# Patient Record
Sex: FEMALE | Race: WHITE | NOT HISPANIC OR LATINO | Employment: UNEMPLOYED | URBAN - NONMETROPOLITAN AREA
[De-identification: names, ages, dates, MRNs, and addresses within clinical notes are randomized per-mention and may not be internally consistent; named-entity substitution may affect disease eponyms.]

---

## 2023-02-24 LAB
ALANINE AMINOTRANSFERASE (SGPT) (U/L) IN SER/PLAS: 16 U/L (ref 7–45)
ALBUMIN (G/DL) IN SER/PLAS: 4 G/DL (ref 3.4–5)
ALKALINE PHOSPHATASE (U/L) IN SER/PLAS: 80 U/L (ref 33–136)
ANION GAP IN SER/PLAS: 11 MMOL/L (ref 10–20)
ASPARTATE AMINOTRANSFERASE (SGOT) (U/L) IN SER/PLAS: 21 U/L (ref 9–39)
BILIRUBIN TOTAL (MG/DL) IN SER/PLAS: 0.9 MG/DL (ref 0–1.2)
CALCIUM (MG/DL) IN SER/PLAS: 9.3 MG/DL (ref 8.6–10.3)
CARBON DIOXIDE, TOTAL (MMOL/L) IN SER/PLAS: 29 MMOL/L (ref 21–32)
CHLORIDE (MMOL/L) IN SER/PLAS: 101 MMOL/L (ref 98–107)
CHOLESTEROL (MG/DL) IN SER/PLAS: 236 MG/DL (ref 0–199)
CHOLESTEROL (MG/DL) IN SER/PLAS: 236 MG/DL (ref 0–199)
CHOLESTEROL IN HDL (MG/DL) IN SER/PLAS: 95 MG/DL
CHOLESTEROL IN HDL (MG/DL) IN SER/PLAS: 95 MG/DL
CHOLESTEROL/HDL RATIO: 2.5
CHOLESTEROL/HDL RATIO: 2.5
COBALAMIN (VITAMIN B12) (PG/ML) IN SER/PLAS: 362 PG/ML (ref 211–911)
CREATININE (MG/DL) IN SER/PLAS: 0.76 MG/DL (ref 0.5–1.05)
ERYTHROCYTE DISTRIBUTION WIDTH (RATIO) BY AUTOMATED COUNT: 13.6 % (ref 11.5–14.5)
ERYTHROCYTE MEAN CORPUSCULAR HEMOGLOBIN CONCENTRATION (G/DL) BY AUTOMATED: 31.9 G/DL (ref 32–36)
ERYTHROCYTE MEAN CORPUSCULAR VOLUME (FL) BY AUTOMATED COUNT: 97 FL (ref 80–100)
ERYTHROCYTES (10*6/UL) IN BLOOD BY AUTOMATED COUNT: 4.33 X10E12/L (ref 4–5.2)
GFR FEMALE: 82 ML/MIN/1.73M2
GLUCOSE (MG/DL) IN SER/PLAS: 100 MG/DL (ref 74–99)
HEMATOCRIT (%) IN BLOOD BY AUTOMATED COUNT: 42 % (ref 36–46)
HEMOGLOBIN (G/DL) IN BLOOD: 13.4 G/DL (ref 12–16)
LDL: 125 MG/DL (ref 0–99)
LEUKOCYTES (10*3/UL) IN BLOOD BY AUTOMATED COUNT: 5.9 X10E9/L (ref 4.4–11.3)
NON-HDL CHOLESTEROL: 141 MG/DL
PLATELETS (10*3/UL) IN BLOOD AUTOMATED COUNT: 278 X10E9/L (ref 150–450)
POTASSIUM (MMOL/L) IN SER/PLAS: 3.9 MMOL/L (ref 3.5–5.3)
PROTEIN TOTAL: 7 G/DL (ref 6.4–8.2)
SODIUM (MMOL/L) IN SER/PLAS: 137 MMOL/L (ref 136–145)
THYROTROPIN (MIU/L) IN SER/PLAS BY DETECTION LIMIT <= 0.05 MIU/L: 3.26 MIU/L (ref 0.44–3.98)
TRIGLYCERIDE (MG/DL) IN SER/PLAS: 79 MG/DL (ref 0–149)
UREA NITROGEN (MG/DL) IN SER/PLAS: 13 MG/DL (ref 6–23)
VLDL: 16 MG/DL (ref 0–40)

## 2023-05-26 ENCOUNTER — APPOINTMENT (OUTPATIENT)
Dept: PRIMARY CARE | Facility: CLINIC | Age: 73
End: 2023-05-26
Payer: MEDICARE

## 2023-06-16 ENCOUNTER — APPOINTMENT (OUTPATIENT)
Dept: PRIMARY CARE | Facility: CLINIC | Age: 73
End: 2023-06-16
Payer: MEDICARE

## 2023-08-31 ENCOUNTER — TELEPHONE (OUTPATIENT)
Dept: PRIMARY CARE | Facility: CLINIC | Age: 73
End: 2023-08-31
Payer: MEDICARE

## 2023-09-29 ENCOUNTER — OFFICE VISIT (OUTPATIENT)
Dept: PRIMARY CARE | Facility: CLINIC | Age: 73
End: 2023-09-29
Payer: MEDICARE

## 2023-09-29 VITALS
DIASTOLIC BLOOD PRESSURE: 84 MMHG | WEIGHT: 128.4 LBS | HEIGHT: 60 IN | SYSTOLIC BLOOD PRESSURE: 148 MMHG | HEART RATE: 66 BPM | TEMPERATURE: 97 F | BODY MASS INDEX: 25.21 KG/M2 | OXYGEN SATURATION: 96 %

## 2023-09-29 DIAGNOSIS — I10 BENIGN ESSENTIAL HTN: ICD-10-CM

## 2023-09-29 DIAGNOSIS — J20.9 ACUTE BRONCHITIS, UNSPECIFIED ORGANISM: ICD-10-CM

## 2023-09-29 DIAGNOSIS — R05.1 ACUTE COUGH: ICD-10-CM

## 2023-09-29 DIAGNOSIS — J02.9 SORE THROAT: ICD-10-CM

## 2023-09-29 DIAGNOSIS — F03.90 DEMENTIA WITHOUT BEHAVIORAL DISTURBANCE, PSYCHOTIC DISTURBANCE, MOOD DISTURBANCE, OR ANXIETY, UNSPECIFIED DEMENTIA SEVERITY, UNSPECIFIED DEMENTIA TYPE (MULTI): Primary | ICD-10-CM

## 2023-09-29 DIAGNOSIS — F32.A ANXIETY AND DEPRESSION: ICD-10-CM

## 2023-09-29 DIAGNOSIS — F41.9 ANXIETY AND DEPRESSION: ICD-10-CM

## 2023-09-29 DIAGNOSIS — E78.5 HYPERLIPIDEMIA, UNSPECIFIED HYPERLIPIDEMIA TYPE: ICD-10-CM

## 2023-09-29 DIAGNOSIS — E03.9 HYPOTHYROIDISM, UNSPECIFIED TYPE: ICD-10-CM

## 2023-09-29 DIAGNOSIS — E55.9 VITAMIN D DEFICIENCY: ICD-10-CM

## 2023-09-29 PROBLEM — N85.02 ATYPICAL ENDOMETRIAL HYPERPLASIA: Status: RESOLVED | Noted: 2023-09-29 | Resolved: 2023-09-29

## 2023-09-29 PROBLEM — J30.9 ALLERGIC RHINITIS: Status: ACTIVE | Noted: 2023-09-29

## 2023-09-29 PROBLEM — K57.90 DIVERTICULOSIS: Status: RESOLVED | Noted: 2023-09-29 | Resolved: 2023-09-29

## 2023-09-29 PROBLEM — D22.5 MELANOCYTIC NEVI OF TRUNK: Status: RESOLVED | Noted: 2019-11-22 | Resolved: 2023-09-29

## 2023-09-29 PROBLEM — S62.101A AVULSION FRACTURE OF RIGHT WRIST: Status: RESOLVED | Noted: 2023-09-29 | Resolved: 2023-09-29

## 2023-09-29 LAB — POC RAPID STREP: NEGATIVE

## 2023-09-29 PROCEDURE — 1036F TOBACCO NON-USER: CPT | Performed by: FAMILY MEDICINE

## 2023-09-29 PROCEDURE — 3077F SYST BP >= 140 MM HG: CPT | Performed by: FAMILY MEDICINE

## 2023-09-29 PROCEDURE — 3079F DIAST BP 80-89 MM HG: CPT | Performed by: FAMILY MEDICINE

## 2023-09-29 PROCEDURE — 1159F MED LIST DOCD IN RCRD: CPT | Performed by: FAMILY MEDICINE

## 2023-09-29 PROCEDURE — 87880 STREP A ASSAY W/OPTIC: CPT | Performed by: FAMILY MEDICINE

## 2023-09-29 PROCEDURE — 1160F RVW MEDS BY RX/DR IN RCRD: CPT | Performed by: FAMILY MEDICINE

## 2023-09-29 PROCEDURE — 99214 OFFICE O/P EST MOD 30 MIN: CPT | Performed by: FAMILY MEDICINE

## 2023-09-29 PROCEDURE — 87635 SARS-COV-2 COVID-19 AMP PRB: CPT

## 2023-09-29 PROCEDURE — 1126F AMNT PAIN NOTED NONE PRSNT: CPT | Performed by: FAMILY MEDICINE

## 2023-09-29 RX ORDER — PRAVASTATIN SODIUM 20 MG/1
20 TABLET ORAL NIGHTLY
Qty: 90 TABLET | Refills: 1 | Status: SHIPPED | OUTPATIENT
Start: 2023-09-29 | End: 2024-02-15 | Stop reason: SDUPTHER

## 2023-09-29 RX ORDER — AZITHROMYCIN 250 MG/1
TABLET, FILM COATED ORAL
Qty: 6 TABLET | Refills: 0 | Status: SHIPPED | OUTPATIENT
Start: 2023-09-29 | End: 2023-10-12 | Stop reason: ALTCHOICE

## 2023-09-29 RX ORDER — PRAVASTATIN SODIUM 20 MG/1
1 TABLET ORAL NIGHTLY
COMMUNITY
Start: 2017-11-20 | End: 2023-09-29 | Stop reason: SDUPTHER

## 2023-09-29 RX ORDER — DONEPEZIL HYDROCHLORIDE 10 MG/1
1 TABLET, FILM COATED ORAL NIGHTLY
COMMUNITY
Start: 2022-05-12 | End: 2023-09-29 | Stop reason: ALTCHOICE

## 2023-09-29 RX ORDER — FLUTICASONE PROPIONATE 50 MCG
SPRAY, SUSPENSION (ML) NASAL
COMMUNITY
Start: 2015-10-14

## 2023-09-29 RX ORDER — LEVOTHYROXINE SODIUM 50 UG/1
TABLET ORAL
COMMUNITY
Start: 2015-12-10 | End: 2023-10-03 | Stop reason: ALTCHOICE

## 2023-09-29 RX ORDER — ESCITALOPRAM OXALATE 5 MG/1
5 TABLET ORAL DAILY
Qty: 30 TABLET | Refills: 2 | Status: SHIPPED | OUTPATIENT
Start: 2023-09-29 | End: 2024-02-15 | Stop reason: SDUPTHER

## 2023-09-29 RX ORDER — METOPROLOL SUCCINATE 50 MG/1
1 TABLET, EXTENDED RELEASE ORAL DAILY
COMMUNITY
Start: 2017-11-10 | End: 2023-09-29 | Stop reason: SDUPTHER

## 2023-09-29 RX ORDER — METOPROLOL SUCCINATE 50 MG/1
50 TABLET, EXTENDED RELEASE ORAL DAILY
Qty: 90 TABLET | Refills: 1 | Status: SHIPPED | OUTPATIENT
Start: 2023-09-29 | End: 2024-02-15 | Stop reason: SDUPTHER

## 2023-09-29 RX ORDER — ASPIRIN 81 MG/1
1 TABLET ORAL DAILY
COMMUNITY
Start: 2017-01-16 | End: 2023-10-12 | Stop reason: ALTCHOICE

## 2023-09-29 ASSESSMENT — PATIENT HEALTH QUESTIONNAIRE - PHQ9
SUM OF ALL RESPONSES TO PHQ9 QUESTIONS 1 AND 2: 0
1. LITTLE INTEREST OR PLEASURE IN DOING THINGS: NOT AT ALL
2. FEELING DOWN, DEPRESSED OR HOPELESS: NOT AT ALL

## 2023-09-29 ASSESSMENT — ENCOUNTER SYMPTOMS
OCCASIONAL FEELINGS OF UNSTEADINESS: 0
LOSS OF SENSATION IN FEET: 0
DEPRESSION: 0

## 2023-09-29 NOTE — PROGRESS NOTES
Subjective   Patient ID: Meryl Leonard is a 73 y.o. female who presents for Medication Problem (Is not taking any medication/Hot flashes all the time).  HPI  Here for follow up . Stopped all her medications including her thyroid and blood pressure medication. Family and patient does not want to restart her thyroid medication till get blood work checked, since even when she was taking it, she was having hot flashes and weight gain.   Family concerned because patient has been drinking heavily was in the ED recently after suffering a fall intoxicated, had bruised her ribs. Has dementia, no longer taking donepezil. Also has been depressed and anxious.   Has been having cough and sore throat x 2 weeks, feels fatigued. No fever. No nausea/vomiting/diarrhea.     Review of Systems  General: no fever  Eyes: no blurry vision  ENT: see HPI  Resp: see HPI  Cardio: no chest pain, no palpitations  Abd: no nausea/vomiting  : no dysuria, no increased urinary frequency      /84 Comment: manual  Pulse 66   Temp 36.1 °C (97 °F)   Ht 1.524 m (5')   Wt 58.2 kg (128 lb 6.4 oz)   SpO2 96%   BMI 25.08 kg/m²       Objective   Physical Exam  Gen: NAD, alert  Head: normocephalic/atraumatic  Eyes: conjunctivae normal  Ears: canals clear bilaterally, TM normal   Nose: external nose normal   Oropharynx: clear   Resp: Clear to auscultation  CVS: Regular rate and rhythm  Abdomen: soft, NT, ND  Ext: no edema, NT of lower extremities  Neuro: gait normal     Assessment/Plan   Problem List Items Addressed This Visit       Benign essential HTN    Relevant Medications    metoprolol succinate XL (Toprol-XL) 50 mg 24 hr tablet    Dementia (CMS/HCC) - Primary    Hypothyroid    Relevant Orders    TSH with reflex to Free T4 if abnormal    Hyperlipidemia    Relevant Medications    pravastatin (Pravachol) 20 mg tablet     Other Visit Diagnoses       Vitamin D deficiency        Relevant Orders    Vitamin B12    Vitamin D 25-Hydroxy,Total (for  eval of Vitamin D levels)    Acute cough        Relevant Orders    Sars-CoV-2 PCR, Symptomatic    Sore throat        Relevant Orders    POCT rapid strep A manually resulted (Completed)    Acute bronchitis, unspecified organism        Relevant Medications    azithromycin (Zithromax) 250 mg tablet    Anxiety and depression        Relevant Medications    escitalopram (Lexapro) 5 mg tablet

## 2023-09-30 LAB — SARS-COV-2 RESULT: NOT DETECTED

## 2023-10-02 ENCOUNTER — LAB (OUTPATIENT)
Dept: LAB | Facility: LAB | Age: 73
End: 2023-10-02
Payer: MEDICARE

## 2023-10-02 DIAGNOSIS — E55.9 VITAMIN D DEFICIENCY: ICD-10-CM

## 2023-10-02 DIAGNOSIS — E03.9 HYPOTHYROIDISM, UNSPECIFIED TYPE: ICD-10-CM

## 2023-10-02 LAB
T4 FREE SERPL-MCNC: 0.63 NG/DL (ref 0.61–1.12)
TSH SERPL-ACNC: 9.48 MIU/L (ref 0.44–3.98)

## 2023-10-02 PROCEDURE — 36415 COLL VENOUS BLD VENIPUNCTURE: CPT

## 2023-10-03 DIAGNOSIS — E03.9 HYPOTHYROIDISM, UNSPECIFIED TYPE: Primary | ICD-10-CM

## 2023-10-03 LAB
25(OH)D3 SERPL-MCNC: 32 NG/ML (ref 30–100)
VIT B12 SERPL-MCNC: 411 PG/ML (ref 211–911)

## 2023-10-03 RX ORDER — LEVOTHYROXINE SODIUM 75 UG/1
75 TABLET ORAL DAILY
Qty: 90 TABLET | Refills: 1 | Status: SHIPPED | OUTPATIENT
Start: 2023-10-03 | End: 2023-10-12 | Stop reason: SDUPTHER

## 2023-10-12 ENCOUNTER — OFFICE VISIT (OUTPATIENT)
Dept: PRIMARY CARE | Facility: CLINIC | Age: 73
End: 2023-10-12
Payer: MEDICARE

## 2023-10-12 VITALS
HEIGHT: 61 IN | HEART RATE: 72 BPM | SYSTOLIC BLOOD PRESSURE: 132 MMHG | TEMPERATURE: 98.9 F | DIASTOLIC BLOOD PRESSURE: 70 MMHG | RESPIRATION RATE: 19 BRPM | OXYGEN SATURATION: 96 % | BODY MASS INDEX: 24.26 KG/M2

## 2023-10-12 DIAGNOSIS — E03.9 HYPOTHYROIDISM, UNSPECIFIED TYPE: ICD-10-CM

## 2023-10-12 DIAGNOSIS — I10 BENIGN ESSENTIAL HTN: Primary | ICD-10-CM

## 2023-10-12 DIAGNOSIS — S23.41XA SPRAIN OF COSTAL CARTILAGE, INITIAL ENCOUNTER: ICD-10-CM

## 2023-10-12 DIAGNOSIS — E78.2 MIXED HYPERLIPIDEMIA: ICD-10-CM

## 2023-10-12 DIAGNOSIS — E03.8 OTHER SPECIFIED HYPOTHYROIDISM: ICD-10-CM

## 2023-10-12 DIAGNOSIS — R41.3 MEMORY LOSS: ICD-10-CM

## 2023-10-12 PROCEDURE — 1036F TOBACCO NON-USER: CPT | Performed by: FAMILY MEDICINE

## 2023-10-12 PROCEDURE — 1159F MED LIST DOCD IN RCRD: CPT | Performed by: FAMILY MEDICINE

## 2023-10-12 PROCEDURE — 3078F DIAST BP <80 MM HG: CPT | Performed by: FAMILY MEDICINE

## 2023-10-12 PROCEDURE — 99214 OFFICE O/P EST MOD 30 MIN: CPT | Performed by: FAMILY MEDICINE

## 2023-10-12 PROCEDURE — 3075F SYST BP GE 130 - 139MM HG: CPT | Performed by: FAMILY MEDICINE

## 2023-10-12 PROCEDURE — 99204 OFFICE O/P NEW MOD 45 MIN: CPT | Performed by: FAMILY MEDICINE

## 2023-10-12 PROCEDURE — 1160F RVW MEDS BY RX/DR IN RCRD: CPT | Performed by: FAMILY MEDICINE

## 2023-10-12 PROCEDURE — 1126F AMNT PAIN NOTED NONE PRSNT: CPT | Performed by: FAMILY MEDICINE

## 2023-10-12 RX ORDER — TRAMADOL HYDROCHLORIDE 50 MG/1
50 TABLET ORAL EVERY 8 HOURS PRN
Qty: 15 TABLET | Refills: 0 | Status: SHIPPED | OUTPATIENT
Start: 2023-10-12 | End: 2023-10-17

## 2023-10-12 RX ORDER — LEVOTHYROXINE SODIUM 75 UG/1
75 TABLET ORAL DAILY
Qty: 90 TABLET | Refills: 3 | Status: SHIPPED | OUTPATIENT
Start: 2023-10-12 | End: 2024-02-15 | Stop reason: SDUPTHER

## 2023-10-12 ASSESSMENT — PAIN SCALES - GENERAL: PAINLEVEL: 0-NO PAIN

## 2023-10-12 NOTE — PROGRESS NOTES
"Subjective   Patient ID: Meryl Leonard is a 73 y.o. female who presents for New Patient Visit (NEW PATIENT HERE TO DISCUSS BLOOD WORK FROM PREVIUS DOCTOR. ALSO WOULD LIKE  HORMONE LEVELS CHECKED ).    HPI She fell recently and went to er dx with bruised ribs.   Pt present with two family members today who give most of history.  She has had some ongoing memory loss.      Review of Systems   Constitutional: Negative.    HENT: Negative.     Respiratory: Negative.     Cardiovascular: Negative.    Gastrointestinal: Negative.    Genitourinary: Negative.    Musculoskeletal:         See HPI   Neurological:  Negative for dizziness, seizures and syncope.       Objective   /70 (BP Location: Right arm, Patient Position: Sitting, BP Cuff Size: Adult)   Pulse 72   Temp 37.2 °C (98.9 °F) (Oral)   Resp 19   Ht 1.549 m (5' 1\")   SpO2 96%   BMI 24.26 kg/m²     Physical Exam  Vitals and nursing note reviewed.   Constitutional:       General: She is not in acute distress.  HENT:      Right Ear: Tympanic membrane and ear canal normal.      Left Ear: Tympanic membrane and ear canal normal.      Nose: Nose normal. No rhinorrhea.      Mouth/Throat:      Pharynx: Oropharynx is clear. No oropharyngeal exudate or posterior oropharyngeal erythema.      Comments: Dentition wnl  Eyes:      Extraocular Movements: Extraocular movements intact.      Conjunctiva/sclera: Conjunctivae normal.      Pupils: Pupils are equal, round, and reactive to light.   Neck:      Vascular: No carotid bruit.   Cardiovascular:      Rate and Rhythm: Normal rate and regular rhythm.      Heart sounds: Normal heart sounds. No murmur heard.  Pulmonary:      Breath sounds: Normal breath sounds. No wheezing or rhonchi.   Abdominal:      General: Bowel sounds are normal. There is no distension.      Palpations: Abdomen is soft. There is no mass.      Tenderness: There is no abdominal tenderness. There is no guarding or rebound.      Hernia: No hernia is present. "   Musculoskeletal:         General: No swelling or tenderness. Normal range of motion.      Cervical back: Normal range of motion and neck supple.      Comments: Some tenderness right ribs   Lymphadenopathy:      Cervical: No cervical adenopathy.   Skin:     General: Skin is warm.      Findings: No rash.   Neurological:      General: No focal deficit present.      Mental Status: She is alert.         Assessment/Plan   Problem List Items Addressed This Visit             ICD-10-CM    Benign essential HTN - Primary I10    Hypothyroid E03.9    Relevant Medications    levothyroxine (Synthroid, Levoxyl) 75 mcg tablet    Hyperlipidemia E78.5     Other Visit Diagnoses         Codes    Memory loss     R41.3    Relevant Orders    Referral to Neurology    Sprain of costal cartilage, initial encounter     S23.41XA    Relevant Medications    traMADol (Ultram) 50 mg tablet          Will restart and continue meds as she has only been back on them for a short time.  Recheck for fasting labs in 2 monhts and can determine if any dosing adjustment needed at that time

## 2023-10-15 ASSESSMENT — ENCOUNTER SYMPTOMS
SEIZURES: 0
DIZZINESS: 0
CARDIOVASCULAR NEGATIVE: 1
RESPIRATORY NEGATIVE: 1
GASTROINTESTINAL NEGATIVE: 1
CONSTITUTIONAL NEGATIVE: 1

## 2023-10-17 ENCOUNTER — APPOINTMENT (OUTPATIENT)
Dept: PRIMARY CARE | Facility: CLINIC | Age: 73
End: 2023-10-17
Payer: MEDICARE

## 2023-11-17 ENCOUNTER — APPOINTMENT (OUTPATIENT)
Dept: PRIMARY CARE | Facility: CLINIC | Age: 73
End: 2023-11-17
Payer: MEDICARE

## 2023-11-30 ENCOUNTER — APPOINTMENT (OUTPATIENT)
Dept: RADIOLOGY | Facility: HOSPITAL | Age: 73
End: 2023-11-30
Payer: MEDICARE

## 2023-11-30 ENCOUNTER — HOSPITAL ENCOUNTER (EMERGENCY)
Facility: HOSPITAL | Age: 73
Discharge: HOME | End: 2023-11-30
Attending: STUDENT IN AN ORGANIZED HEALTH CARE EDUCATION/TRAINING PROGRAM
Payer: MEDICARE

## 2023-11-30 VITALS
OXYGEN SATURATION: 96 % | HEART RATE: 83 BPM | HEIGHT: 61 IN | RESPIRATION RATE: 16 BRPM | BODY MASS INDEX: 25.49 KG/M2 | SYSTOLIC BLOOD PRESSURE: 176 MMHG | TEMPERATURE: 97.5 F | WEIGHT: 135 LBS | DIASTOLIC BLOOD PRESSURE: 78 MMHG

## 2023-11-30 DIAGNOSIS — S92.415A CLOSED NONDISPLACED FRACTURE OF PROXIMAL PHALANX OF LEFT GREAT TOE, INITIAL ENCOUNTER: Primary | ICD-10-CM

## 2023-11-30 PROCEDURE — 73630 X-RAY EXAM OF FOOT: CPT | Mod: LT,FY

## 2023-11-30 PROCEDURE — 73630 X-RAY EXAM OF FOOT: CPT | Mod: LEFT SIDE | Performed by: RADIOLOGY

## 2023-11-30 PROCEDURE — 99283 EMERGENCY DEPT VISIT LOW MDM: CPT | Performed by: STUDENT IN AN ORGANIZED HEALTH CARE EDUCATION/TRAINING PROGRAM

## 2023-11-30 RX ORDER — IBUPROFEN 400 MG/1
400 TABLET ORAL ONCE
Status: DISCONTINUED | OUTPATIENT
Start: 2023-11-30 | End: 2023-11-30 | Stop reason: HOSPADM

## 2023-11-30 RX ORDER — IBUPROFEN 200 MG
200 TABLET ORAL EVERY 8 HOURS PRN
Qty: 20 TABLET | Refills: 0 | Status: SHIPPED | OUTPATIENT
Start: 2023-11-30 | End: 2023-12-07

## 2023-11-30 ASSESSMENT — PAIN DESCRIPTION - ORIENTATION: ORIENTATION: LEFT

## 2023-11-30 ASSESSMENT — PAIN - FUNCTIONAL ASSESSMENT
PAIN_FUNCTIONAL_ASSESSMENT: 0-10
PAIN_FUNCTIONAL_ASSESSMENT: 0-10

## 2023-11-30 ASSESSMENT — PAIN DESCRIPTION - LOCATION: LOCATION: FOOT

## 2023-11-30 ASSESSMENT — PAIN DESCRIPTION - FREQUENCY: FREQUENCY: CONSTANT/CONTINUOUS

## 2023-11-30 ASSESSMENT — PAIN SCALES - GENERAL
PAINLEVEL_OUTOF10: 5 - MODERATE PAIN
PAINLEVEL_OUTOF10: 5 - MODERATE PAIN

## 2023-11-30 ASSESSMENT — PAIN DESCRIPTION - PAIN TYPE: TYPE: ACUTE PAIN

## 2023-12-01 NOTE — ED PROVIDER NOTES
HPI   Chief Complaint   Patient presents with    Foot Injury     Fell today injuring left foot       73-year-old female was outside walking earlier today when she slipped on her driveway coming down on her left foot has pain and swelling  To the proximal first phalanx                          No data recorded                Patient History   Past Medical History:   Diagnosis Date    Atypical endometrial hyperplasia 2023    Cyst of Bartholin's gland     Bartholin cyst    Mixed incontinence 2017    Urinary incontinence, mixed    Overactive bladder     OAB (overactive bladder)    Personal history of malignant neoplasm of other parts of uterus 11/10/2017    History of malignant neoplasm of endometrium    Personal history of other diseases of the female genital tract 2016    History of postmenopausal bleeding    Personal history of other diseases of urinary system     History of prolapse of bladder    Personal history of transient ischemic attack (TIA), and cerebral infarction without residual deficits     History of transient cerebral ischemia    Varicella without complication     Varicella without complication     Past Surgical History:   Procedure Laterality Date    HYSTEROSCOPY  11/10/2017    Hysteroscopy With Endometrial Ablation    OTHER SURGICAL HISTORY  2016    Dental Surgery    TOTAL ABDOMINAL HYSTERECTOMY W/ BILATERAL SALPINGOOPHORECTOMY  11/10/2017    Total Abdominal Hysterectomy With Removal Of Both Ovaries     Family History   Problem Relation Name Age of Onset    Hypertension Father      Hypertension Sister      Heart disease Sister      Heart attack Sister      Diabetes Paternal Grandfather      Other (gynecological cancer) Other aunt      Social History     Tobacco Use    Smoking status: Former     Packs/day: 1.00     Years: 10.00     Additional pack years: 0.00     Total pack years: 10.00     Types: Cigarettes     Quit date: 1995     Years since quittin.9     Passive  exposure: Past    Smokeless tobacco: Never   Vaping Use    Vaping Use: Never used   Substance Use Topics    Alcohol use: Yes     Alcohol/week: 2.0 standard drinks of alcohol     Types: 2 Glasses of wine per week    Drug use: Never       Physical Exam   ED Triage Vitals [11/30/23 1618]   Temp Heart Rate Resp BP   36.4 °C (97.5 °F) 83 16 176/78      SpO2 Temp Source Heart Rate Source Patient Position   96 % Tympanic -- Sitting      BP Location FiO2 (%)     Left arm --       Physical Exam  Vitals and nursing note reviewed.   Constitutional:       General: She is not in acute distress.     Appearance: Normal appearance. She is well-developed.   HENT:      Head: Normocephalic and atraumatic.   Eyes:      Conjunctiva/sclera: Conjunctivae normal.   Cardiovascular:      Rate and Rhythm: Normal rate and regular rhythm.      Heart sounds: No murmur heard.  Pulmonary:      Effort: Pulmonary effort is normal. No respiratory distress.      Breath sounds: Normal breath sounds.   Abdominal:      Palpations: Abdomen is soft.      Tenderness: There is no abdominal tenderness.   Musculoskeletal:         General: No swelling.      Cervical back: Neck supple.      Comments: Pain and tenderness to the first phalanx of the left foot with slight swelling   Skin:     General: Skin is warm and dry.      Capillary Refill: Capillary refill takes less than 2 seconds.   Neurological:      General: No focal deficit present.      Mental Status: She is alert.   Psychiatric:         Mood and Affect: Mood normal.         ED Course & MDM   Diagnoses as of 11/30/23 1907   Closed nondisplaced fracture of proximal phalanx of left great toe, initial encounter       Medical Decision Making  Patient with fracture to the first proximal phalanx of the left foot  Sandeep taped and postop shoe  Will refer to podiatry for further eval and treatment    XR foot left 3+ views   Final Result    DJD as described.          Acute nondisplaced  fracture involving the  proximal medial corner of    the 1st proximal phalanx.          Signed by: Bc Bliss 11/30/2023 4:53 PM    Dictation workstation:   [id number]             Procedure  Procedures     Bella Cao PA-C  11/30/23 1911

## 2023-12-07 ENCOUNTER — CLINICAL SUPPORT (OUTPATIENT)
Dept: ORTHOPEDIC SURGERY | Facility: CLINIC | Age: 73
End: 2023-12-07
Payer: MEDICARE

## 2023-12-07 ENCOUNTER — APPOINTMENT (OUTPATIENT)
Dept: NEUROLOGY | Facility: CLINIC | Age: 73
End: 2023-12-07
Payer: MEDICARE

## 2023-12-07 VITALS — WEIGHT: 125 LBS | BODY MASS INDEX: 23.6 KG/M2 | HEIGHT: 61 IN

## 2023-12-07 DIAGNOSIS — S92.415A NONDISPLACED FRACTURE OF PROXIMAL PHALANX OF LEFT GREAT TOE, INITIAL ENCOUNTER FOR CLOSED FRACTURE: Primary | ICD-10-CM

## 2023-12-07 PROCEDURE — 99203 OFFICE O/P NEW LOW 30 MIN: CPT

## 2023-12-07 ASSESSMENT — PAIN SCALES - GENERAL: PAINLEVEL_OUTOF10: 4

## 2023-12-07 ASSESSMENT — PAIN - FUNCTIONAL ASSESSMENT: PAIN_FUNCTIONAL_ASSESSMENT: 0-10

## 2023-12-07 NOTE — PROGRESS NOTES
HPI  Meryl Leonard is a 73 y.o. female, accompanied by her daughter,  in office today for   Chief Complaint   Patient presents with    Left Foot - Pain   .  she fell on the ice a week ago.  She went to the [facility] ED at the time, diagnosed with a big toe fracture, placed into post op shoe.  Patient has been wearing shoe.  She states she is not having much pain at this point and has been walking on it as tolerated.      Medication  Current Outpatient Medications on File Prior to Visit   Medication Sig Dispense Refill    escitalopram (Lexapro) 5 mg tablet Take 1 tablet (5 mg) by mouth once daily. Start after finishing antibiotics 30 tablet 2    fluticasone (Flonase) 50 mcg/actuation nasal spray Use 1 spray in each nostril two times daily for 90      ibuprofen 200 mg tablet Take 1 tablet (200 mg) by mouth every 8 hours if needed for moderate pain (4 - 6) for up to 7 days. 20 tablet 0    levothyroxine (Synthroid, Levoxyl) 75 mcg tablet Take 1 tablet (75 mcg) by mouth once daily. 90 tablet 3    metoprolol succinate XL (Toprol-XL) 50 mg 24 hr tablet Take 1 tablet (50 mg) by mouth once daily. 90 tablet 1    pravastatin (Pravachol) 20 mg tablet Take 1 tablet (20 mg) by mouth once daily at bedtime. 90 tablet 1     No current facility-administered medications on file prior to visit.       Physical Exam  Constitutional: well developed, well nourished female in no acute distress  Psychiatric: normal mood, appropriate affect  Eyes: sclera anicteric  HENT: normocephalic/atraumatic  CV: regular rate and rhythm   Respiratory: non labored breathing  Integumentary: no rash  Neurological: moves all extremities    Left Ankle Exam     Tenderness   The patient is experiencing no tenderness.   Swelling: mild (about big toe)    Range of Motion   The patient has normal left ankle ROM.     Other   Erythema: absent  Scars: absent  Sensation: normal    Comments:  Mild, clearing ecchymosis at base of big toe.              Imaging/Lab:  X-rays  were taken 11/30/23 which were reviewed by myself and read by radiology and show an acute, nondisplaced fracture involving the proximal medial corner of the 1st proximal phalanx      Assessment  Assessment: nondisplaced fracture of the proximal phalanx of the left great toe    Plan  Plan:  History, physical exam, and imaging were reviewed with patient. Discussed continuing in the post op shoe while having pain.  As the pain continues to subside and the swelling goes down, can transition to a good, supportive shoe.  Continue to be weightbearing as tolerated.  RICE and antiinflammatories as needed for pain.  Follow Up: Patient to follow up as needed if pain persists or gets worse.      All questions were answered for the patient prior to end of exam and patient addressed their understanding.    Rachael Guidry PA-C  12/07/23

## 2024-02-08 ENCOUNTER — TELEPHONE (OUTPATIENT)
Dept: PRIMARY CARE | Facility: CLINIC | Age: 74
End: 2024-02-08
Payer: MEDICARE

## 2024-02-08 NOTE — TELEPHONE ENCOUNTER
Patient daughter took her to new PCP, Marylou was not aware and Meryl did not want to change.  She does not want to go to other PCP, can she come back to this office??

## 2024-02-15 ENCOUNTER — OFFICE VISIT (OUTPATIENT)
Dept: PRIMARY CARE | Facility: CLINIC | Age: 74
End: 2024-02-15
Payer: MEDICARE

## 2024-02-15 VITALS
HEIGHT: 61 IN | WEIGHT: 132 LBS | TEMPERATURE: 98.7 F | HEART RATE: 87 BPM | SYSTOLIC BLOOD PRESSURE: 120 MMHG | DIASTOLIC BLOOD PRESSURE: 68 MMHG | RESPIRATION RATE: 20 BRPM | OXYGEN SATURATION: 98 % | BODY MASS INDEX: 24.92 KG/M2

## 2024-02-15 DIAGNOSIS — Z00.00 ROUTINE MEDICAL EXAM: ICD-10-CM

## 2024-02-15 DIAGNOSIS — Z12.31 ENCOUNTER FOR SCREENING MAMMOGRAM FOR MALIGNANT NEOPLASM OF BREAST: ICD-10-CM

## 2024-02-15 DIAGNOSIS — M81.0 OSTEOPOROSIS, UNSPECIFIED OSTEOPOROSIS TYPE, UNSPECIFIED PATHOLOGICAL FRACTURE PRESENCE: ICD-10-CM

## 2024-02-15 DIAGNOSIS — Z12.11 SCREENING FOR COLON CANCER: Primary | ICD-10-CM

## 2024-02-15 DIAGNOSIS — E78.5 HYPERLIPIDEMIA, UNSPECIFIED HYPERLIPIDEMIA TYPE: ICD-10-CM

## 2024-02-15 DIAGNOSIS — F41.9 ANXIETY AND DEPRESSION: ICD-10-CM

## 2024-02-15 DIAGNOSIS — I10 BENIGN ESSENTIAL HTN: ICD-10-CM

## 2024-02-15 DIAGNOSIS — E03.9 HYPOTHYROIDISM, UNSPECIFIED TYPE: ICD-10-CM

## 2024-02-15 DIAGNOSIS — Z13.820 SCREENING FOR OSTEOPOROSIS: ICD-10-CM

## 2024-02-15 DIAGNOSIS — F32.A ANXIETY AND DEPRESSION: ICD-10-CM

## 2024-02-15 DIAGNOSIS — F03.90 DEMENTIA WITHOUT BEHAVIORAL DISTURBANCE, PSYCHOTIC DISTURBANCE, MOOD DISTURBANCE, OR ANXIETY, UNSPECIFIED DEMENTIA SEVERITY, UNSPECIFIED DEMENTIA TYPE (MULTI): ICD-10-CM

## 2024-02-15 PROCEDURE — 99213 OFFICE O/P EST LOW 20 MIN: CPT | Performed by: FAMILY MEDICINE

## 2024-02-15 PROCEDURE — 3074F SYST BP LT 130 MM HG: CPT | Performed by: FAMILY MEDICINE

## 2024-02-15 PROCEDURE — 99397 PER PM REEVAL EST PAT 65+ YR: CPT | Performed by: FAMILY MEDICINE

## 2024-02-15 PROCEDURE — 1036F TOBACCO NON-USER: CPT | Performed by: FAMILY MEDICINE

## 2024-02-15 PROCEDURE — 1157F ADVNC CARE PLAN IN RCRD: CPT | Performed by: FAMILY MEDICINE

## 2024-02-15 PROCEDURE — 3078F DIAST BP <80 MM HG: CPT | Performed by: FAMILY MEDICINE

## 2024-02-15 PROCEDURE — 1126F AMNT PAIN NOTED NONE PRSNT: CPT | Performed by: FAMILY MEDICINE

## 2024-02-15 RX ORDER — ESCITALOPRAM OXALATE 5 MG/1
5 TABLET ORAL DAILY
Qty: 90 TABLET | Refills: 3 | Status: SHIPPED | OUTPATIENT
Start: 2024-02-15 | End: 2024-04-19 | Stop reason: WASHOUT

## 2024-02-15 RX ORDER — PRAVASTATIN SODIUM 20 MG/1
20 TABLET ORAL NIGHTLY
Qty: 90 TABLET | Refills: 1 | Status: SHIPPED | OUTPATIENT
Start: 2024-02-15

## 2024-02-15 RX ORDER — METOPROLOL SUCCINATE 50 MG/1
50 TABLET, EXTENDED RELEASE ORAL DAILY
Qty: 90 TABLET | Refills: 1 | Status: SHIPPED | OUTPATIENT
Start: 2024-02-15

## 2024-02-15 RX ORDER — LEVOTHYROXINE SODIUM 75 UG/1
75 TABLET ORAL DAILY
Qty: 90 TABLET | Refills: 3 | Status: SHIPPED | OUTPATIENT
Start: 2024-02-15

## 2024-02-15 RX ORDER — ESCITALOPRAM OXALATE 5 MG/1
5 TABLET ORAL DAILY
Qty: 30 TABLET | Refills: 2 | Status: SHIPPED | OUTPATIENT
Start: 2024-02-15 | End: 2024-02-15 | Stop reason: SDUPTHER

## 2024-02-15 ASSESSMENT — ENCOUNTER SYMPTOMS
NEUROLOGICAL NEGATIVE: 1
CONSTITUTIONAL NEGATIVE: 1
RESPIRATORY NEGATIVE: 1
MUSCULOSKELETAL NEGATIVE: 1
CARDIOVASCULAR NEGATIVE: 1
GASTROINTESTINAL NEGATIVE: 1

## 2024-02-15 ASSESSMENT — PATIENT HEALTH QUESTIONNAIRE - PHQ9
1. LITTLE INTEREST OR PLEASURE IN DOING THINGS: NOT AT ALL
2. FEELING DOWN, DEPRESSED OR HOPELESS: NOT AT ALL
SUM OF ALL RESPONSES TO PHQ9 QUESTIONS 1 AND 2: 0

## 2024-02-15 ASSESSMENT — PAIN SCALES - GENERAL: PAINLEVEL: 0-NO PAIN

## 2024-02-15 NOTE — PROGRESS NOTES
"Subjective   Patient ID: Meryl Leonard is a 73 y.o. female who presents for Follow-up (PATIENT HERE TO DISCUSS HER MEDICATIONS. SHE HAS NOT BEEN TAKING ANYTHING ON HER MEDICATION LIST AND UNSURE OF WHAT SHE SHOULD BE TAKING).    HPI she has been off all of her meds for over a month.  She presents with her daughter today who give much of history.  She has seen neuro in the past for memory loss but has not had any follow up and did not go to last referral They do notice that she has had some progression of memory loss.  She has currently moved in with her.      Review of Systems   Constitutional: Negative.    HENT: Negative.     Respiratory: Negative.     Cardiovascular: Negative.    Gastrointestinal: Negative.    Genitourinary: Negative.    Musculoskeletal: Negative.    Neurological: Negative.        Objective   /68 (BP Location: Right arm, Patient Position: Sitting, BP Cuff Size: Adult)   Pulse 87   Temp 37.1 °C (98.7 °F) (Skin)   Resp 20   Ht 1.549 m (5' 1\")   Wt 59.9 kg (132 lb)   SpO2 98%   BMI 24.94 kg/m²     Physical Exam  Vitals and nursing note reviewed.   Constitutional:       General: She is not in acute distress.  HENT:      Right Ear: Tympanic membrane and ear canal normal.      Left Ear: Tympanic membrane and ear canal normal.      Nose: Nose normal. No rhinorrhea.      Mouth/Throat:      Pharynx: Oropharynx is clear. No oropharyngeal exudate or posterior oropharyngeal erythema.      Comments: Dentition wnl  Eyes:      Extraocular Movements: Extraocular movements intact.      Conjunctiva/sclera: Conjunctivae normal.      Pupils: Pupils are equal, round, and reactive to light.   Neck:      Vascular: No carotid bruit.   Cardiovascular:      Rate and Rhythm: Normal rate and regular rhythm.      Heart sounds: Normal heart sounds. No murmur heard.  Pulmonary:      Breath sounds: Normal breath sounds. No wheezing or rhonchi.   Abdominal:      General: Bowel sounds are normal. There is no " distension.      Palpations: Abdomen is soft. There is no mass.      Tenderness: There is no abdominal tenderness. There is no guarding or rebound.      Hernia: No hernia is present.   Musculoskeletal:         General: No swelling or tenderness. Normal range of motion.      Cervical back: Normal range of motion and neck supple.   Lymphadenopathy:      Cervical: No cervical adenopathy.   Skin:     General: Skin is warm.      Findings: No rash.   Neurological:      General: No focal deficit present.      Mental Status: She is alert.         Assessment/Plan   Problem List Items Addressed This Visit             ICD-10-CM    Benign essential HTN I10    Relevant Medications    metoprolol succinate XL (Toprol-XL) 50 mg 24 hr tablet    Other Relevant Orders    CBC and Auto Differential    Dementia (CMS/HCC) F03.90    Relevant Orders    Referral to Neurology    Hypothyroid E03.9    Relevant Medications    levothyroxine (Synthroid, Levoxyl) 75 mcg tablet    Other Relevant Orders    CBC and Auto Differential    TSH with reflex to Free T4 if abnormal    Hyperlipidemia E78.5    Relevant Medications    pravastatin (Pravachol) 20 mg tablet     Other Visit Diagnoses         Codes    Screening for colon cancer    -  Primary Z12.11    Relevant Orders    Cologuard® colon cancer screening    Routine medical exam     Z00.00    Relevant Orders    CT cardiac scoring wo IV contrast    CBC and Auto Differential    Comprehensive Metabolic Panel    TSH with reflex to Free T4 if abnormal    Lipid Panel    Encounter for screening mammogram for malignant neoplasm of breast     Z12.31    Relevant Orders    BI mammo bilateral screening tomosynthesis    Screening for osteoporosis     Z13.820    Relevant Orders    XR DEXA bone density    CBC and Auto Differential    Osteoporosis, unspecified osteoporosis type, unspecified pathological fracture presence     M81.0    Relevant Orders    XR DEXA bone density    CBC and Auto Differential    Anxiety and  depression     F41.9, F32.A    Relevant Medications    escitalopram (Lexapro) 5 mg tablet    Other Relevant Orders    CBC and Auto Differential        Refill and restart meds and follow up for fasting labs in approx 8 wks will set up with neuro for eval

## 2024-02-19 ENCOUNTER — HOSPITAL ENCOUNTER (OUTPATIENT)
Dept: RADIOLOGY | Facility: HOSPITAL | Age: 74
Discharge: HOME | End: 2024-02-19
Payer: MEDICARE

## 2024-02-19 DIAGNOSIS — Z00.00 ROUTINE MEDICAL EXAM: ICD-10-CM

## 2024-02-19 DIAGNOSIS — Z12.31 ENCOUNTER FOR SCREENING MAMMOGRAM FOR MALIGNANT NEOPLASM OF BREAST: ICD-10-CM

## 2024-02-19 PROCEDURE — 77063 BREAST TOMOSYNTHESIS BI: CPT | Mod: BILATERAL PROCEDURE | Performed by: RADIOLOGY

## 2024-02-19 PROCEDURE — 77067 SCR MAMMO BI INCL CAD: CPT

## 2024-02-19 PROCEDURE — 75571 CT HRT W/O DYE W/CA TEST: CPT

## 2024-02-19 PROCEDURE — 77067 SCR MAMMO BI INCL CAD: CPT | Mod: BILATERAL PROCEDURE | Performed by: RADIOLOGY

## 2024-03-01 ENCOUNTER — LAB (OUTPATIENT)
Dept: LAB | Facility: LAB | Age: 74
End: 2024-03-01
Payer: MEDICARE

## 2024-03-01 ENCOUNTER — HOSPITAL ENCOUNTER (OUTPATIENT)
Dept: RADIOLOGY | Facility: HOSPITAL | Age: 74
Discharge: HOME | End: 2024-03-01
Payer: MEDICARE

## 2024-03-01 DIAGNOSIS — R41.3 OTHER AMNESIA: Primary | ICD-10-CM

## 2024-03-01 DIAGNOSIS — R41.3 OTHER AMNESIA: ICD-10-CM

## 2024-03-01 LAB
ALBUMIN SERPL BCP-MCNC: 4.1 G/DL (ref 3.4–5)
ALP SERPL-CCNC: 76 U/L (ref 33–136)
ALT SERPL W P-5'-P-CCNC: 16 U/L (ref 7–45)
AMMONIA PLAS-SCNC: 30 UMOL/L (ref 16–53)
ANION GAP SERPL CALC-SCNC: 12 MMOL/L (ref 10–20)
AST SERPL W P-5'-P-CCNC: 18 U/L (ref 9–39)
BASOPHILS # BLD AUTO: 0.04 X10*3/UL (ref 0–0.1)
BASOPHILS NFR BLD AUTO: 0.5 %
BILIRUB SERPL-MCNC: 0.8 MG/DL (ref 0–1.2)
BUN SERPL-MCNC: 11 MG/DL (ref 6–23)
CALCIUM SERPL-MCNC: 9.7 MG/DL (ref 8.6–10.3)
CHLORIDE SERPL-SCNC: 102 MMOL/L (ref 98–107)
CO2 SERPL-SCNC: 28 MMOL/L (ref 21–32)
CREAT SERPL-MCNC: 0.75 MG/DL (ref 0.5–1.05)
EGFRCR SERPLBLD CKD-EPI 2021: 83 ML/MIN/1.73M*2
EOSINOPHIL # BLD AUTO: 0.13 X10*3/UL (ref 0–0.4)
EOSINOPHIL NFR BLD AUTO: 1.8 %
ERYTHROCYTE [DISTWIDTH] IN BLOOD BY AUTOMATED COUNT: 13.2 % (ref 11.5–14.5)
GLUCOSE SERPL-MCNC: 96 MG/DL (ref 74–99)
HCT VFR BLD AUTO: 42.8 % (ref 36–46)
HGB BLD-MCNC: 14.1 G/DL (ref 12–16)
IMM GRANULOCYTES # BLD AUTO: 0.01 X10*3/UL (ref 0–0.5)
IMM GRANULOCYTES NFR BLD AUTO: 0.1 % (ref 0–0.9)
LYMPHOCYTES # BLD AUTO: 1.43 X10*3/UL (ref 0.8–3)
LYMPHOCYTES NFR BLD AUTO: 19.6 %
MCH RBC QN AUTO: 30.7 PG (ref 26–34)
MCHC RBC AUTO-ENTMCNC: 32.9 G/DL (ref 32–36)
MCV RBC AUTO: 93 FL (ref 80–100)
MONOCYTES # BLD AUTO: 0.66 X10*3/UL (ref 0.05–0.8)
MONOCYTES NFR BLD AUTO: 9 %
NEUTROPHILS # BLD AUTO: 5.03 X10*3/UL (ref 1.6–5.5)
NEUTROPHILS NFR BLD AUTO: 69 %
NRBC BLD-RTO: 0 /100 WBCS (ref 0–0)
PLATELET # BLD AUTO: 288 X10*3/UL (ref 150–450)
POTASSIUM SERPL-SCNC: 4 MMOL/L (ref 3.5–5.3)
PROT SERPL-MCNC: 6.7 G/DL (ref 6.4–8.2)
RBC # BLD AUTO: 4.6 X10*6/UL (ref 4–5.2)
SODIUM SERPL-SCNC: 138 MMOL/L (ref 136–145)
TSH SERPL-ACNC: 3.65 MIU/L (ref 0.44–3.98)
WBC # BLD AUTO: 7.3 X10*3/UL (ref 4.4–11.3)

## 2024-03-01 PROCEDURE — 86780 TREPONEMA PALLIDUM: CPT

## 2024-03-01 PROCEDURE — 84443 ASSAY THYROID STIM HORMONE: CPT

## 2024-03-01 PROCEDURE — 70551 MRI BRAIN STEM W/O DYE: CPT

## 2024-03-01 PROCEDURE — 70551 MRI BRAIN STEM W/O DYE: CPT | Performed by: RADIOLOGY

## 2024-03-01 PROCEDURE — 80053 COMPREHEN METABOLIC PANEL: CPT

## 2024-03-01 PROCEDURE — 36415 COLL VENOUS BLD VENIPUNCTURE: CPT

## 2024-03-01 PROCEDURE — 82607 VITAMIN B-12: CPT

## 2024-03-01 PROCEDURE — 82140 ASSAY OF AMMONIA: CPT

## 2024-03-01 PROCEDURE — 85025 COMPLETE CBC W/AUTO DIFF WBC: CPT

## 2024-03-02 LAB
TREPONEMA PALLIDUM IGG+IGM AB [PRESENCE] IN SERUM OR PLASMA BY IMMUNOASSAY: NONREACTIVE
VIT B12 SERPL-MCNC: 271 PG/ML (ref 211–911)

## 2024-03-05 NOTE — PROGRESS NOTES
Patient ID: Meryl Leonard is a 73 y.o. female.  Primary Care Provider: Wilson Dillard DO    Subjective    HPI: 67yo referred from Dr. Cool for CAH. She states that she has had spotting for the last few years. Had an US which showed an 11mm EMS. She then underwent an EMB which showed CAH.      Medical History:  HTN  No stroke, MI, or DVT    Surgical History:  Inguinal hernia repair    Social History:  Former smoker 20 years ago  Occasional EtOH  No illicits  Retired from [employer]    Obstetrics/Gynecology History:     x 3 without complication  Last pap  and WNL, remote h/o abnormal pap  Menarche at age 12  Menopause in her 40s  HRT x 6 years  Last mammogram  and WNL  She has never had a colonoscopy    Family History:   Paternal aunt with cervical or uterine cancer  No breast, ovarian, or colon cancer      Objective    Visit Vitals  /65 (BP Location: Right arm, Patient Position: Sitting, BP Cuff Size: Adult)   Pulse 72   Resp 18        Interval history:  Patient is a 73 year old female that is s/p total laparoscopic hysterectomy, bilateral salpingo-oophorectomy and modified Wall Currie Culdoplasty with enterocele repair, anterior/posterior colporrhaphy, perineoplasty, cystoscopy on 3/21/17 for pelvic organ prolapse stage II and 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 Here for annual exam.  Patient now following with neurology for memory loss.  Patients daughter is with her at appointment today able to answer health questions.  Patient denies any vaginal bleeding, pink tinged discharge. Patient denies any constipation or diarrhea.  Appetite has been good.  Energy levels are baseline.  Patient denies hematuria.  Patient denies urinary leakage or incontinence. Mammogram is up to date.  Patient is not currently sexually active.      Physical Exam:    Constitutional: Doing well. LAVINIA  Eyes: PERRL  ENMT: Moist mucus membranes  Head/Neck: Supple.  Symmetrical  Cardiovascular: Regular, rate and rhythm. 2+ equal pulses of the extremities  Respiratory/Thorax: CTA. RRR. Chest rise symmetrical.  Gastrointestinal: Non-distended, soft, non-tender  Genitourinary:   Normal external female genitalia. No vulvar lesions noted  Speculum exam: Smooth vaginal walls without lesions or masses. Vaginal cuff visualized without lesions, atrophic changes  Bimanual exam: Smooth vaginal wall without lesions or masses.  Surgically absent uterus, cervix, and adnexa.    Rectovaginal exam: smooth rectovaginal septum without lesions or masses  Musculoskeletal: ROM intact, no joint swelling, normal strength  Extremities: No edema  Neurological: Alert and oriented x 3. Pleasant and cooperative.  Lymphatic: No lymphadenopathy. No lymphedema  Psychological: Appropriate mood and behavior  Skin: Warm and dry, no lesions, no rashes    A complete review of systems was performed and all systems were normal except what is noted in the interval history.          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  LAVINIA 7 years.        PLAN:  F/U in 1 year or as needed  Physical examination was within normal limits today.  She is currently LAVINIA.  We reviewed signs and symptoms of possible recurrence with the patient and she will call our office should she experience any of these.

## 2024-03-07 ENCOUNTER — OFFICE VISIT (OUTPATIENT)
Dept: GYNECOLOGIC ONCOLOGY | Facility: CLINIC | Age: 74
End: 2024-03-07
Payer: MEDICARE

## 2024-03-07 VITALS
BODY MASS INDEX: 26.15 KG/M2 | DIASTOLIC BLOOD PRESSURE: 65 MMHG | SYSTOLIC BLOOD PRESSURE: 158 MMHG | RESPIRATION RATE: 18 BRPM | HEIGHT: 60 IN | HEART RATE: 72 BPM | WEIGHT: 133.2 LBS

## 2024-03-07 DIAGNOSIS — C54.1 ENDOMETRIAL CANCER (MULTI): Primary | ICD-10-CM

## 2024-03-07 PROCEDURE — 3077F SYST BP >= 140 MM HG: CPT | Performed by: NURSE PRACTITIONER

## 2024-03-07 PROCEDURE — 1157F ADVNC CARE PLAN IN RCRD: CPT | Performed by: NURSE PRACTITIONER

## 2024-03-07 PROCEDURE — 3078F DIAST BP <80 MM HG: CPT | Performed by: NURSE PRACTITIONER

## 2024-03-07 PROCEDURE — 99213 OFFICE O/P EST LOW 20 MIN: CPT | Performed by: NURSE PRACTITIONER

## 2024-03-07 PROCEDURE — 1036F TOBACCO NON-USER: CPT | Performed by: NURSE PRACTITIONER

## 2024-03-07 PROCEDURE — 1126F AMNT PAIN NOTED NONE PRSNT: CPT | Performed by: NURSE PRACTITIONER

## 2024-03-18 ENCOUNTER — APPOINTMENT (OUTPATIENT)
Dept: RADIOLOGY | Facility: HOSPITAL | Age: 74
End: 2024-03-18
Payer: MEDICARE

## 2024-03-19 ENCOUNTER — APPOINTMENT (OUTPATIENT)
Dept: RADIOLOGY | Facility: HOSPITAL | Age: 74
End: 2024-03-19
Payer: MEDICARE

## 2024-04-19 ENCOUNTER — OFFICE VISIT (OUTPATIENT)
Dept: PRIMARY CARE | Facility: CLINIC | Age: 74
End: 2024-04-19
Payer: MEDICARE

## 2024-04-19 VITALS
HEART RATE: 72 BPM | WEIGHT: 133 LBS | OXYGEN SATURATION: 96 % | TEMPERATURE: 97.8 F | HEIGHT: 60 IN | DIASTOLIC BLOOD PRESSURE: 58 MMHG | SYSTOLIC BLOOD PRESSURE: 136 MMHG | RESPIRATION RATE: 16 BRPM | BODY MASS INDEX: 26.11 KG/M2

## 2024-04-19 DIAGNOSIS — E53.8 VITAMIN B12 DEFICIENCY: Primary | ICD-10-CM

## 2024-04-19 DIAGNOSIS — I10 BENIGN ESSENTIAL HTN: ICD-10-CM

## 2024-04-19 PROCEDURE — 2500000004 HC RX 250 GENERAL PHARMACY W/ HCPCS (ALT 636 FOR OP/ED): Performed by: FAMILY MEDICINE

## 2024-04-19 PROCEDURE — 1159F MED LIST DOCD IN RCRD: CPT | Performed by: FAMILY MEDICINE

## 2024-04-19 PROCEDURE — 96372 THER/PROPH/DIAG INJ SC/IM: CPT | Performed by: FAMILY MEDICINE

## 2024-04-19 PROCEDURE — 99214 OFFICE O/P EST MOD 30 MIN: CPT | Performed by: FAMILY MEDICINE

## 2024-04-19 PROCEDURE — 99214 OFFICE O/P EST MOD 30 MIN: CPT | Mod: 25 | Performed by: FAMILY MEDICINE

## 2024-04-19 PROCEDURE — 3078F DIAST BP <80 MM HG: CPT | Performed by: FAMILY MEDICINE

## 2024-04-19 PROCEDURE — 1126F AMNT PAIN NOTED NONE PRSNT: CPT | Performed by: FAMILY MEDICINE

## 2024-04-19 PROCEDURE — 3075F SYST BP GE 130 - 139MM HG: CPT | Performed by: FAMILY MEDICINE

## 2024-04-19 RX ORDER — DONEPEZIL HYDROCHLORIDE 5 MG/1
TABLET, FILM COATED ORAL
COMMUNITY
Start: 2024-03-31

## 2024-04-19 RX ORDER — MAGNESIUM 200 MG
1000 TABLET ORAL DAILY
Qty: 90 TABLET | Refills: 1 | Status: SHIPPED | OUTPATIENT
Start: 2024-04-19

## 2024-04-19 RX ORDER — CYANOCOBALAMIN 1000 UG/ML
1000 INJECTION, SOLUTION INTRAMUSCULAR; SUBCUTANEOUS ONCE
Status: COMPLETED | OUTPATIENT
Start: 2024-04-19 | End: 2024-04-19

## 2024-04-19 RX ADMIN — CYANOCOBALAMIN 1000 MCG: 1000 INJECTION, SOLUTION INTRAMUSCULAR at 13:15

## 2024-04-19 ASSESSMENT — ENCOUNTER SYMPTOMS
LOSS OF SENSATION IN FEET: 0
DEPRESSION: 0
OCCASIONAL FEELINGS OF UNSTEADINESS: 0

## 2024-04-19 ASSESSMENT — COLUMBIA-SUICIDE SEVERITY RATING SCALE - C-SSRS
1. IN THE PAST MONTH, HAVE YOU WISHED YOU WERE DEAD OR WISHED YOU COULD GO TO SLEEP AND NOT WAKE UP?: NO
2. HAVE YOU ACTUALLY HAD ANY THOUGHTS OF KILLING YOURSELF?: NO
6. HAVE YOU EVER DONE ANYTHING, STARTED TO DO ANYTHING, OR PREPARED TO DO ANYTHING TO END YOUR LIFE?: NO

## 2024-04-19 ASSESSMENT — PATIENT HEALTH QUESTIONNAIRE - PHQ9
1. LITTLE INTEREST OR PLEASURE IN DOING THINGS: NOT AT ALL
SUM OF ALL RESPONSES TO PHQ9 QUESTIONS 1 AND 2: 0
2. FEELING DOWN, DEPRESSED OR HOPELESS: NOT AT ALL

## 2024-04-19 ASSESSMENT — PAIN SCALES - GENERAL: PAINLEVEL: 0-NO PAIN

## 2024-04-19 NOTE — PROGRESS NOTES
Subjective   Patient ID: Meryl Leonard is a 73 y.o. female who presents for Follow-up (Pt is here for a follow up since seeing the neurologist and would like to discuss her b12 result and would like to review her lexapro dose.).    HPI hx of uterine cancer with hysterectomy    Review of Systems   Constitutional: Negative.    HENT: Negative.     Respiratory: Negative.     Cardiovascular: Negative.    Gastrointestinal: Negative.    Genitourinary: Negative.    Musculoskeletal: Negative.    Neurological: Negative.        Objective   /58 (BP Location: Left arm, Patient Position: Sitting, BP Cuff Size: Adult)   Pulse 72   Temp 36.6 °C (97.8 °F) (Temporal)   Resp 16   Ht 1.524 m (5')   Wt 60.3 kg (133 lb)   SpO2 96%   BMI 25.97 kg/m²     Physical Exam  Constitutional:       General: She is not in acute distress.     Appearance: Normal appearance.   Cardiovascular:      Rate and Rhythm: Normal rate and regular rhythm.      Heart sounds: No murmur heard.  Pulmonary:      Breath sounds: Normal breath sounds. No wheezing.   Neurological:      Mental Status: She is alert.         Assessment/Plan   Problem List Items Addressed This Visit             ICD-10-CM    Benign essential HTN I10     Other Visit Diagnoses         Codes    Vitamin B12 deficiency    -  Primary E53.8    Relevant Medications    cyanocobalamin, vitamin B-12, 1,000 mcg tablet, sublingual    cyanocobalamin (Vitamin B-12) injection 1,000 mcg (Completed)        She had seen Sr Bhat for eval and had labs done.  Reviewed with pt.

## 2024-04-21 ASSESSMENT — ENCOUNTER SYMPTOMS
RESPIRATORY NEGATIVE: 1
NEUROLOGICAL NEGATIVE: 1
GASTROINTESTINAL NEGATIVE: 1
CONSTITUTIONAL NEGATIVE: 1
MUSCULOSKELETAL NEGATIVE: 1
CARDIOVASCULAR NEGATIVE: 1

## 2024-05-08 ENCOUNTER — TELEPHONE (OUTPATIENT)
Dept: PRIMARY CARE | Facility: CLINIC | Age: 74
End: 2024-05-08
Payer: MEDICARE

## 2024-05-08 DIAGNOSIS — R23.2 VASOMOTOR FLUSHING: ICD-10-CM

## 2024-05-08 DIAGNOSIS — R23.2 VASOMOTOR FLUSHING: Primary | ICD-10-CM

## 2024-05-08 RX ORDER — FEZOLINETANT 45 MG/1
45 TABLET, FILM COATED ORAL DAILY
Qty: 30 TABLET | Refills: 3 | Status: SHIPPED | OUTPATIENT
Start: 2024-05-08 | End: 2024-05-16

## 2024-05-16 RX ORDER — FEZOLINETANT 45 MG/1
45 TABLET, FILM COATED ORAL DAILY
Qty: 30 TABLET | Refills: 3 | Status: SHIPPED | OUTPATIENT
Start: 2024-05-16

## 2024-07-11 DIAGNOSIS — E78.5 HYPERLIPIDEMIA, UNSPECIFIED HYPERLIPIDEMIA TYPE: ICD-10-CM

## 2024-07-11 DIAGNOSIS — I10 BENIGN ESSENTIAL HTN: ICD-10-CM

## 2024-07-12 RX ORDER — PRAVASTATIN SODIUM 20 MG/1
20 TABLET ORAL NIGHTLY
Qty: 90 TABLET | Refills: 3 | Status: SHIPPED | OUTPATIENT
Start: 2024-07-12

## 2024-07-12 RX ORDER — METOPROLOL SUCCINATE 50 MG/1
50 TABLET, EXTENDED RELEASE ORAL DAILY
Qty: 90 TABLET | Refills: 3 | Status: SHIPPED | OUTPATIENT
Start: 2024-07-12

## 2024-08-04 DIAGNOSIS — R41.3 MEMORY LOSS: Primary | ICD-10-CM

## 2024-08-08 RX ORDER — DONEPEZIL HYDROCHLORIDE 10 MG/1
10 TABLET, FILM COATED ORAL NIGHTLY
Qty: 90 TABLET | Refills: 3 | Status: SHIPPED | OUTPATIENT
Start: 2024-08-08

## 2024-11-26 ENCOUNTER — TELEPHONE (OUTPATIENT)
Dept: PRIMARY CARE | Facility: CLINIC | Age: 74
End: 2024-11-26
Payer: MEDICARE

## 2024-11-26 DIAGNOSIS — R19.7 DIARRHEA, UNSPECIFIED TYPE: ICD-10-CM

## 2024-11-26 DIAGNOSIS — R19.7 DIARRHEA, UNSPECIFIED TYPE: Primary | ICD-10-CM

## 2024-11-26 RX ORDER — AZITHROMYCIN 500 MG/1
500 TABLET, FILM COATED ORAL DAILY
Qty: 3 TABLET | Refills: 0 | Status: SHIPPED | OUTPATIENT
Start: 2024-11-26 | End: 2024-11-29

## 2024-11-27 ENCOUNTER — LAB (OUTPATIENT)
Dept: LAB | Facility: LAB | Age: 74
End: 2024-11-27
Payer: MEDICARE

## 2024-11-27 DIAGNOSIS — R19.7 DIARRHEA, UNSPECIFIED TYPE: ICD-10-CM

## 2024-11-27 PROCEDURE — 87506 IADNA-DNA/RNA PROBE TQ 6-11: CPT

## 2024-11-27 PROCEDURE — 87329 GIARDIA AG IA: CPT

## 2024-11-27 PROCEDURE — 87328 CRYPTOSPORIDIUM AG IA: CPT

## 2024-11-29 ENCOUNTER — TELEPHONE (OUTPATIENT)
Dept: PRIMARY CARE | Facility: CLINIC | Age: 74
End: 2024-11-29
Payer: MEDICARE

## 2024-11-29 LAB
C COLI+JEJ+UPSA DNA STL QL NAA+PROBE: DETECTED
EC STX1 GENE STL QL NAA+PROBE: NOT DETECTED
EC STX2 GENE STL QL NAA+PROBE: NOT DETECTED
NOROVIRUS GI + GII RNA STL NAA+PROBE: NOT DETECTED
RV RNA STL NAA+PROBE: NOT DETECTED
SALMONELLA DNA STL QL NAA+PROBE: NOT DETECTED
SHIGELLA DNA SPEC QL NAA+PROBE: NOT DETECTED
V CHOLERAE DNA STL QL NAA+PROBE: NOT DETECTED
Y ENTEROCOL DNA STL QL NAA+PROBE: NOT DETECTED

## 2024-12-01 LAB
CRYPTOSP AG STL QL IA: NEGATIVE
G LAMBLIA AG STL QL IA: NEGATIVE

## 2024-12-03 LAB — O+P STL MICRO: NEGATIVE

## 2024-12-20 DIAGNOSIS — E03.9 HYPOTHYROIDISM, UNSPECIFIED TYPE: ICD-10-CM

## 2024-12-24 RX ORDER — LEVOTHYROXINE SODIUM 75 UG/1
75 TABLET ORAL DAILY
Qty: 90 TABLET | Refills: 3 | Status: SHIPPED | OUTPATIENT
Start: 2024-12-24

## 2025-03-13 ENCOUNTER — APPOINTMENT (OUTPATIENT)
Dept: GYNECOLOGIC ONCOLOGY | Facility: CLINIC | Age: 75
End: 2025-03-13
Payer: MEDICARE

## 2025-03-31 ENCOUNTER — APPOINTMENT (OUTPATIENT)
Dept: CARDIOLOGY | Facility: HOSPITAL | Age: 75
End: 2025-03-31
Payer: MEDICARE

## 2025-03-31 ENCOUNTER — APPOINTMENT (OUTPATIENT)
Dept: RADIOLOGY | Facility: HOSPITAL | Age: 75
End: 2025-03-31
Payer: MEDICARE

## 2025-03-31 ENCOUNTER — HOSPITAL ENCOUNTER (OUTPATIENT)
Facility: HOSPITAL | Age: 75
Setting detail: OBSERVATION
Discharge: HOME | End: 2025-04-01
Attending: EMERGENCY MEDICINE | Admitting: STUDENT IN AN ORGANIZED HEALTH CARE EDUCATION/TRAINING PROGRAM
Payer: MEDICARE

## 2025-03-31 DIAGNOSIS — R55 SYNCOPE, UNSPECIFIED SYNCOPE TYPE: ICD-10-CM

## 2025-03-31 DIAGNOSIS — G45.9 TRANSIENT CEREBRAL ISCHEMIC ATTACK, UNSPECIFIED: ICD-10-CM

## 2025-03-31 DIAGNOSIS — R55 SYNCOPE AND COLLAPSE: Primary | ICD-10-CM

## 2025-03-31 DIAGNOSIS — R94.31 ABNORMAL EKG: ICD-10-CM

## 2025-03-31 LAB
ALBUMIN SERPL BCP-MCNC: 4.3 G/DL (ref 3.4–5)
ALP SERPL-CCNC: 103 U/L (ref 33–136)
ALT SERPL W P-5'-P-CCNC: 19 U/L (ref 7–45)
ANION GAP SERPL CALC-SCNC: 13 MMOL/L (ref 10–20)
APPEARANCE UR: CLEAR
AST SERPL W P-5'-P-CCNC: 24 U/L (ref 9–39)
BASOPHILS # BLD AUTO: 0.05 X10*3/UL (ref 0–0.1)
BASOPHILS NFR BLD AUTO: 0.4 %
BILIRUB SERPL-MCNC: 0.5 MG/DL (ref 0–1.2)
BILIRUB UR STRIP.AUTO-MCNC: NEGATIVE MG/DL
BUN SERPL-MCNC: 16 MG/DL (ref 6–23)
CALCIUM SERPL-MCNC: 9.7 MG/DL (ref 8.6–10.3)
CARDIAC TROPONIN I PNL SERPL HS: 5 NG/L (ref 0–13)
CARDIAC TROPONIN I PNL SERPL HS: 5 NG/L (ref 0–13)
CHLORIDE SERPL-SCNC: 99 MMOL/L (ref 98–107)
CO2 SERPL-SCNC: 28 MMOL/L (ref 21–32)
COLOR UR: YELLOW
CREAT SERPL-MCNC: 1.01 MG/DL (ref 0.5–1.05)
EGFRCR SERPLBLD CKD-EPI 2021: 58 ML/MIN/1.73M*2
EOSINOPHIL # BLD AUTO: 0.14 X10*3/UL (ref 0–0.4)
EOSINOPHIL NFR BLD AUTO: 1.2 %
ERYTHROCYTE [DISTWIDTH] IN BLOOD BY AUTOMATED COUNT: 13.2 % (ref 11.5–14.5)
GLUCOSE BLD MANUAL STRIP-MCNC: 109 MG/DL (ref 74–99)
GLUCOSE SERPL-MCNC: 106 MG/DL (ref 74–99)
GLUCOSE UR STRIP.AUTO-MCNC: NORMAL MG/DL
HCT VFR BLD AUTO: 41 % (ref 36–46)
HGB BLD-MCNC: 13.5 G/DL (ref 12–16)
IMM GRANULOCYTES # BLD AUTO: 0.03 X10*3/UL (ref 0–0.5)
IMM GRANULOCYTES NFR BLD AUTO: 0.3 % (ref 0–0.9)
KETONES UR STRIP.AUTO-MCNC: NEGATIVE MG/DL
LEUKOCYTE ESTERASE UR QL STRIP.AUTO: NEGATIVE
LYMPHOCYTES # BLD AUTO: 1 X10*3/UL (ref 0.8–3)
LYMPHOCYTES NFR BLD AUTO: 8.4 %
MCH RBC QN AUTO: 31.4 PG (ref 26–34)
MCHC RBC AUTO-ENTMCNC: 32.9 G/DL (ref 32–36)
MCV RBC AUTO: 95 FL (ref 80–100)
MONOCYTES # BLD AUTO: 1.08 X10*3/UL (ref 0.05–0.8)
MONOCYTES NFR BLD AUTO: 9.1 %
NEUTROPHILS # BLD AUTO: 9.59 X10*3/UL (ref 1.6–5.5)
NEUTROPHILS NFR BLD AUTO: 80.6 %
NITRITE UR QL STRIP.AUTO: NEGATIVE
NRBC BLD-RTO: 0 /100 WBCS (ref 0–0)
PH UR STRIP.AUTO: 6.5 [PH]
PLATELET # BLD AUTO: 264 X10*3/UL (ref 150–450)
POTASSIUM SERPL-SCNC: 4.3 MMOL/L (ref 3.5–5.3)
PROT SERPL-MCNC: 7.1 G/DL (ref 6.4–8.2)
PROT UR STRIP.AUTO-MCNC: NEGATIVE MG/DL
RBC # BLD AUTO: 4.3 X10*6/UL (ref 4–5.2)
RBC # UR STRIP.AUTO: NEGATIVE MG/DL
SODIUM SERPL-SCNC: 136 MMOL/L (ref 136–145)
SP GR UR STRIP.AUTO: 1.01
UROBILINOGEN UR STRIP.AUTO-MCNC: NORMAL MG/DL
WBC # BLD AUTO: 11.9 X10*3/UL (ref 4.4–11.3)

## 2025-03-31 PROCEDURE — 99285 EMERGENCY DEPT VISIT HI MDM: CPT | Mod: 25 | Performed by: EMERGENCY MEDICINE

## 2025-03-31 PROCEDURE — 82947 ASSAY GLUCOSE BLOOD QUANT: CPT

## 2025-03-31 PROCEDURE — 84484 ASSAY OF TROPONIN QUANT: CPT | Performed by: PHYSICIAN ASSISTANT

## 2025-03-31 PROCEDURE — 36415 COLL VENOUS BLD VENIPUNCTURE: CPT | Performed by: PHYSICIAN ASSISTANT

## 2025-03-31 PROCEDURE — 84075 ASSAY ALKALINE PHOSPHATASE: CPT | Performed by: PHYSICIAN ASSISTANT

## 2025-03-31 PROCEDURE — 93005 ELECTROCARDIOGRAM TRACING: CPT

## 2025-03-31 PROCEDURE — 85025 COMPLETE CBC W/AUTO DIFF WBC: CPT | Performed by: PHYSICIAN ASSISTANT

## 2025-03-31 PROCEDURE — 70450 CT HEAD/BRAIN W/O DYE: CPT | Performed by: SURGERY

## 2025-03-31 PROCEDURE — 81003 URINALYSIS AUTO W/O SCOPE: CPT | Performed by: PHYSICIAN ASSISTANT

## 2025-03-31 PROCEDURE — 70450 CT HEAD/BRAIN W/O DYE: CPT

## 2025-03-31 RX ORDER — LABETALOL HYDROCHLORIDE 5 MG/ML
10 INJECTION, SOLUTION INTRAVENOUS EVERY 10 MIN PRN
Status: DISCONTINUED | OUTPATIENT
Start: 2025-03-31 | End: 2025-04-01 | Stop reason: HOSPADM

## 2025-03-31 RX ORDER — CLOPIDOGREL BISULFATE 75 MG/1
300 TABLET ORAL ONCE
Status: COMPLETED | OUTPATIENT
Start: 2025-03-31 | End: 2025-04-01

## 2025-03-31 RX ORDER — LEVOTHYROXINE SODIUM 75 UG/1
75 TABLET ORAL DAILY
Status: DISCONTINUED | OUTPATIENT
Start: 2025-04-01 | End: 2025-04-01 | Stop reason: HOSPADM

## 2025-03-31 RX ORDER — METOPROLOL SUCCINATE 25 MG/1
50 TABLET, EXTENDED RELEASE ORAL DAILY
Status: DISCONTINUED | OUTPATIENT
Start: 2025-04-01 | End: 2025-04-01 | Stop reason: HOSPADM

## 2025-03-31 RX ORDER — PRAVASTATIN SODIUM 20 MG/1
20 TABLET ORAL NIGHTLY
Status: DISCONTINUED | OUTPATIENT
Start: 2025-04-01 | End: 2025-04-01 | Stop reason: HOSPADM

## 2025-03-31 RX ORDER — HEPARIN SODIUM 5000 [USP'U]/ML
5000 INJECTION, SOLUTION INTRAVENOUS; SUBCUTANEOUS EVERY 8 HOURS
Status: DISCONTINUED | OUTPATIENT
Start: 2025-04-01 | End: 2025-04-01 | Stop reason: HOSPADM

## 2025-03-31 RX ORDER — CLOPIDOGREL BISULFATE 75 MG/1
75 TABLET ORAL DAILY
Status: DISCONTINUED | OUTPATIENT
Start: 2025-04-01 | End: 2025-04-01 | Stop reason: HOSPADM

## 2025-03-31 RX ORDER — DONEPEZIL HYDROCHLORIDE 5 MG/1
10 TABLET, FILM COATED ORAL NIGHTLY
Status: DISCONTINUED | OUTPATIENT
Start: 2025-04-01 | End: 2025-04-01 | Stop reason: HOSPADM

## 2025-03-31 ASSESSMENT — PAIN SCALES - GENERAL
PAINLEVEL_OUTOF10: 0 - NO PAIN

## 2025-03-31 ASSESSMENT — COLUMBIA-SUICIDE SEVERITY RATING SCALE - C-SSRS
6. HAVE YOU EVER DONE ANYTHING, STARTED TO DO ANYTHING, OR PREPARED TO DO ANYTHING TO END YOUR LIFE?: NO
2. HAVE YOU ACTUALLY HAD ANY THOUGHTS OF KILLING YOURSELF?: NO
1. IN THE PAST MONTH, HAVE YOU WISHED YOU WERE DEAD OR WISHED YOU COULD GO TO SLEEP AND NOT WAKE UP?: NO

## 2025-03-31 ASSESSMENT — PAIN - FUNCTIONAL ASSESSMENT
PAIN_FUNCTIONAL_ASSESSMENT: 0-10
PAIN_FUNCTIONAL_ASSESSMENT: 0-10

## 2025-04-01 ENCOUNTER — APPOINTMENT (OUTPATIENT)
Dept: CARDIOLOGY | Facility: HOSPITAL | Age: 75
End: 2025-04-01
Payer: MEDICARE

## 2025-04-01 ENCOUNTER — APPOINTMENT (OUTPATIENT)
Dept: RADIOLOGY | Facility: HOSPITAL | Age: 75
End: 2025-04-01
Payer: MEDICARE

## 2025-04-01 VITALS
TEMPERATURE: 98.8 F | HEART RATE: 77 BPM | OXYGEN SATURATION: 93 % | RESPIRATION RATE: 18 BRPM | HEIGHT: 60 IN | BODY MASS INDEX: 29.04 KG/M2 | SYSTOLIC BLOOD PRESSURE: 164 MMHG | WEIGHT: 147.93 LBS | DIASTOLIC BLOOD PRESSURE: 77 MMHG

## 2025-04-01 PROBLEM — R55 SYNCOPE: Status: ACTIVE | Noted: 2025-04-01

## 2025-04-01 LAB
ALBUMIN SERPL BCP-MCNC: 3.8 G/DL (ref 3.4–5)
ALP SERPL-CCNC: 84 U/L (ref 33–136)
ALT SERPL W P-5'-P-CCNC: 16 U/L (ref 7–45)
ANION GAP SERPL CALC-SCNC: 11 MMOL/L (ref 10–20)
AORTIC VALVE PEAK VELOCITY: 1.51 M/S
AST SERPL W P-5'-P-CCNC: 18 U/L (ref 9–39)
ATRIAL RATE: 88 BPM
AV PEAK GRADIENT: 9 MMHG
AVA (PEAK VEL): 1.9 CM2
BILIRUB SERPL-MCNC: 0.7 MG/DL (ref 0–1.2)
BNP SERPL-MCNC: 221 PG/ML (ref 0–99)
BUN SERPL-MCNC: 11 MG/DL (ref 6–23)
CALCIUM SERPL-MCNC: 9 MG/DL (ref 8.6–10.3)
CHLORIDE SERPL-SCNC: 104 MMOL/L (ref 98–107)
CHOLEST SERPL-MCNC: 215 MG/DL (ref 0–199)
CHOLESTEROL/HDL RATIO: 2.8
CO2 SERPL-SCNC: 27 MMOL/L (ref 21–32)
CREAT SERPL-MCNC: 0.75 MG/DL (ref 0.5–1.05)
EGFRCR SERPLBLD CKD-EPI 2021: 83 ML/MIN/1.73M*2
EJECTION FRACTION APICAL 4 CHAMBER: 60.5
EJECTION FRACTION: 63 %
ERYTHROCYTE [DISTWIDTH] IN BLOOD BY AUTOMATED COUNT: 13 % (ref 11.5–14.5)
EST. AVERAGE GLUCOSE BLD GHB EST-MCNC: 117 MG/DL
GLUCOSE BLD MANUAL STRIP-MCNC: 110 MG/DL (ref 74–99)
GLUCOSE BLD MANUAL STRIP-MCNC: 128 MG/DL (ref 74–99)
GLUCOSE SERPL-MCNC: 122 MG/DL (ref 74–99)
HBA1C MFR BLD: 5.7 %
HCT VFR BLD AUTO: 37.8 % (ref 36–46)
HDLC SERPL-MCNC: 76.1 MG/DL
HGB BLD-MCNC: 12.5 G/DL (ref 12–16)
HOLD SPECIMEN: NORMAL
LDLC SERPL CALC-MCNC: 120 MG/DL
LEFT ATRIUM VOLUME AREA LENGTH INDEX BSA: 29.2 ML/M2
LEFT VENTRICLE INTERNAL DIMENSION DIASTOLE: 5.42 CM (ref 3.5–6)
LEFT VENTRICULAR OUTFLOW TRACT DIAMETER: 1.8 CM
LEGIONELLA AG UR QL: NEGATIVE
MAGNESIUM SERPL-MCNC: 1.77 MG/DL (ref 1.6–2.4)
MCH RBC QN AUTO: 30.7 PG (ref 26–34)
MCHC RBC AUTO-ENTMCNC: 33.1 G/DL (ref 32–36)
MCV RBC AUTO: 93 FL (ref 80–100)
MITRAL VALVE E/A RATIO: 0.73
NON HDL CHOLESTEROL: 139 MG/DL (ref 0–149)
NRBC BLD-RTO: 0 /100 WBCS (ref 0–0)
P AXIS: 68 DEGREES
P OFFSET: 209 MS
P ONSET: 147 MS
PLATELET # BLD AUTO: 223 X10*3/UL (ref 150–450)
POTASSIUM SERPL-SCNC: 4 MMOL/L (ref 3.5–5.3)
PR INTERVAL: 156 MS
PROCALCITONIN SERPL-MCNC: 0.07 NG/ML
PROT SERPL-MCNC: 6.1 G/DL (ref 6.4–8.2)
Q ONSET: 225 MS
QRS COUNT: 14 BEATS
QRS DURATION: 78 MS
QT INTERVAL: 378 MS
QTC CALCULATION(BAZETT): 457 MS
QTC FREDERICIA: 429 MS
R AXIS: 46 DEGREES
RBC # BLD AUTO: 4.07 X10*6/UL (ref 4–5.2)
RIGHT VENTRICLE FREE WALL PEAK S': 15.1 CM/S
S PNEUM AG UR QL: NEGATIVE
SODIUM SERPL-SCNC: 138 MMOL/L (ref 136–145)
T AXIS: 84 DEGREES
T OFFSET: 414 MS
TRICUSPID ANNULAR PLANE SYSTOLIC EXCURSION: 1.6 CM
TRIGL SERPL-MCNC: 97 MG/DL (ref 0–149)
VENTRICULAR RATE: 88 BPM
VLDL: 19 MG/DL (ref 0–40)
WBC # BLD AUTO: 8 X10*3/UL (ref 4.4–11.3)

## 2025-04-01 PROCEDURE — 70544 MR ANGIOGRAPHY HEAD W/O DYE: CPT | Mod: 59

## 2025-04-01 PROCEDURE — G0426 INPT/ED TELECONSULT50: HCPCS | Performed by: PSYCHIATRY & NEUROLOGY

## 2025-04-01 PROCEDURE — 70547 MR ANGIOGRAPHY NECK W/O DYE: CPT | Performed by: RADIOLOGY

## 2025-04-01 PROCEDURE — 93005 ELECTROCARDIOGRAM TRACING: CPT

## 2025-04-01 PROCEDURE — 93306 TTE W/DOPPLER COMPLETE: CPT

## 2025-04-01 PROCEDURE — 36415 COLL VENOUS BLD VENIPUNCTURE: CPT | Performed by: STUDENT IN AN ORGANIZED HEALTH CARE EDUCATION/TRAINING PROGRAM

## 2025-04-01 PROCEDURE — 87449 NOS EACH ORGANISM AG IA: CPT | Mod: GENLAB | Performed by: STUDENT IN AN ORGANIZED HEALTH CARE EDUCATION/TRAINING PROGRAM

## 2025-04-01 PROCEDURE — 84145 PROCALCITONIN (PCT): CPT | Mod: GENLAB | Performed by: STUDENT IN AN ORGANIZED HEALTH CARE EDUCATION/TRAINING PROGRAM

## 2025-04-01 PROCEDURE — 93306 TTE W/DOPPLER COMPLETE: CPT | Performed by: INTERNAL MEDICINE

## 2025-04-01 PROCEDURE — 70547 MR ANGIOGRAPHY NECK W/O DYE: CPT

## 2025-04-01 PROCEDURE — 83735 ASSAY OF MAGNESIUM: CPT | Performed by: STUDENT IN AN ORGANIZED HEALTH CARE EDUCATION/TRAINING PROGRAM

## 2025-04-01 PROCEDURE — 80053 COMPREHEN METABOLIC PANEL: CPT | Performed by: STUDENT IN AN ORGANIZED HEALTH CARE EDUCATION/TRAINING PROGRAM

## 2025-04-01 PROCEDURE — 70551 MRI BRAIN STEM W/O DYE: CPT | Performed by: RADIOLOGY

## 2025-04-01 PROCEDURE — 2500000002 HC RX 250 W HCPCS SELF ADMINISTERED DRUGS (ALT 637 FOR MEDICARE OP, ALT 636 FOR OP/ED): Performed by: STUDENT IN AN ORGANIZED HEALTH CARE EDUCATION/TRAINING PROGRAM

## 2025-04-01 PROCEDURE — 99234 HOSP IP/OBS SM DT SF/LOW 45: CPT | Performed by: NURSE PRACTITIONER

## 2025-04-01 PROCEDURE — 97165 OT EVAL LOW COMPLEX 30 MIN: CPT | Mod: GO | Performed by: OCCUPATIONAL THERAPIST

## 2025-04-01 PROCEDURE — G0378 HOSPITAL OBSERVATION PER HR: HCPCS

## 2025-04-01 PROCEDURE — 96372 THER/PROPH/DIAG INJ SC/IM: CPT | Performed by: STUDENT IN AN ORGANIZED HEALTH CARE EDUCATION/TRAINING PROGRAM

## 2025-04-01 PROCEDURE — 87899 AGENT NOS ASSAY W/OPTIC: CPT | Mod: GENLAB | Performed by: STUDENT IN AN ORGANIZED HEALTH CARE EDUCATION/TRAINING PROGRAM

## 2025-04-01 PROCEDURE — 80061 LIPID PANEL: CPT | Performed by: STUDENT IN AN ORGANIZED HEALTH CARE EDUCATION/TRAINING PROGRAM

## 2025-04-01 PROCEDURE — 2500000004 HC RX 250 GENERAL PHARMACY W/ HCPCS (ALT 636 FOR OP/ED): Performed by: STUDENT IN AN ORGANIZED HEALTH CARE EDUCATION/TRAINING PROGRAM

## 2025-04-01 PROCEDURE — 85027 COMPLETE CBC AUTOMATED: CPT | Performed by: STUDENT IN AN ORGANIZED HEALTH CARE EDUCATION/TRAINING PROGRAM

## 2025-04-01 PROCEDURE — 2500000001 HC RX 250 WO HCPCS SELF ADMINISTERED DRUGS (ALT 637 FOR MEDICARE OP): Performed by: STUDENT IN AN ORGANIZED HEALTH CARE EDUCATION/TRAINING PROGRAM

## 2025-04-01 PROCEDURE — 83880 ASSAY OF NATRIURETIC PEPTIDE: CPT | Performed by: STUDENT IN AN ORGANIZED HEALTH CARE EDUCATION/TRAINING PROGRAM

## 2025-04-01 PROCEDURE — 87081 CULTURE SCREEN ONLY: CPT | Mod: GENLAB | Performed by: STUDENT IN AN ORGANIZED HEALTH CARE EDUCATION/TRAINING PROGRAM

## 2025-04-01 PROCEDURE — 82947 ASSAY GLUCOSE BLOOD QUANT: CPT

## 2025-04-01 PROCEDURE — 83036 HEMOGLOBIN GLYCOSYLATED A1C: CPT | Mod: GENLAB | Performed by: STUDENT IN AN ORGANIZED HEALTH CARE EDUCATION/TRAINING PROGRAM

## 2025-04-01 PROCEDURE — 94760 N-INVAS EAR/PLS OXIMETRY 1: CPT

## 2025-04-01 PROCEDURE — 70544 MR ANGIOGRAPHY HEAD W/O DYE: CPT | Performed by: RADIOLOGY

## 2025-04-01 PROCEDURE — 70551 MRI BRAIN STEM W/O DYE: CPT

## 2025-04-01 RX ORDER — ASPIRIN 81 MG/1
81 TABLET ORAL DAILY
Status: DISCONTINUED | OUTPATIENT
Start: 2025-04-02 | End: 2025-04-01 | Stop reason: HOSPADM

## 2025-04-01 RX ORDER — BISMUTH SUBSALICYLATE 262 MG
1 TABLET,CHEWABLE ORAL DAILY
COMMUNITY

## 2025-04-01 RX ORDER — ASPIRIN 81 MG/1
81 TABLET ORAL DAILY
COMMUNITY

## 2025-04-01 RX ADMIN — LEVOTHYROXINE SODIUM 75 MCG: 0.07 TABLET ORAL at 11:34

## 2025-04-01 RX ADMIN — CLOPIDOGREL BISULFATE 75 MG: 75 TABLET ORAL at 11:34

## 2025-04-01 RX ADMIN — METOPROLOL SUCCINATE 50 MG: 25 TABLET, EXTENDED RELEASE ORAL at 11:34

## 2025-04-01 RX ADMIN — HEPARIN SODIUM 5000 UNITS: 5000 INJECTION INTRAVENOUS; SUBCUTANEOUS at 00:54

## 2025-04-01 RX ADMIN — CLOPIDOGREL BISULFATE 300 MG: 75 TABLET ORAL at 00:53

## 2025-04-01 SDOH — SOCIAL STABILITY: SOCIAL INSECURITY: WITHIN THE LAST YEAR, HAVE YOU BEEN HUMILIATED OR EMOTIONALLY ABUSED IN OTHER WAYS BY YOUR PARTNER OR EX-PARTNER?: NO

## 2025-04-01 SDOH — ECONOMIC STABILITY: HOUSING INSECURITY: AT ANY TIME IN THE PAST 12 MONTHS, WERE YOU HOMELESS OR LIVING IN A SHELTER (INCLUDING NOW)?: NO

## 2025-04-01 SDOH — ECONOMIC STABILITY: FOOD INSECURITY: WITHIN THE PAST 12 MONTHS, YOU WORRIED THAT YOUR FOOD WOULD RUN OUT BEFORE YOU GOT THE MONEY TO BUY MORE.: NEVER TRUE

## 2025-04-01 SDOH — ECONOMIC STABILITY: INCOME INSECURITY: IN THE PAST 12 MONTHS HAS THE ELECTRIC, GAS, OIL, OR WATER COMPANY THREATENED TO SHUT OFF SERVICES IN YOUR HOME?: NO

## 2025-04-01 SDOH — ECONOMIC STABILITY: HOUSING INSECURITY: IN THE LAST 12 MONTHS, WAS THERE A TIME WHEN YOU WERE NOT ABLE TO PAY THE MORTGAGE OR RENT ON TIME?: NO

## 2025-04-01 SDOH — ECONOMIC STABILITY: FOOD INSECURITY: HOW HARD IS IT FOR YOU TO PAY FOR THE VERY BASICS LIKE FOOD, HOUSING, MEDICAL CARE, AND HEATING?: NOT HARD AT ALL

## 2025-04-01 SDOH — SOCIAL STABILITY: SOCIAL INSECURITY: WITHIN THE LAST YEAR, HAVE YOU BEEN AFRAID OF YOUR PARTNER OR EX-PARTNER?: NO

## 2025-04-01 SDOH — ECONOMIC STABILITY: HOUSING INSECURITY: IN THE PAST 12 MONTHS, HOW MANY TIMES HAVE YOU MOVED WHERE YOU WERE LIVING?: 1

## 2025-04-01 SDOH — SOCIAL STABILITY: SOCIAL INSECURITY
WITHIN THE LAST YEAR, HAVE YOU BEEN RAPED OR FORCED TO HAVE ANY KIND OF SEXUAL ACTIVITY BY YOUR PARTNER OR EX-PARTNER?: NO

## 2025-04-01 SDOH — SOCIAL STABILITY: SOCIAL INSECURITY
WITHIN THE LAST YEAR, HAVE YOU BEEN KICKED, HIT, SLAPPED, OR OTHERWISE PHYSICALLY HURT BY YOUR PARTNER OR EX-PARTNER?: NO

## 2025-04-01 SDOH — SOCIAL STABILITY: SOCIAL INSECURITY: HAVE YOU HAD THOUGHTS OF HARMING ANYONE ELSE?: NO

## 2025-04-01 SDOH — ECONOMIC STABILITY: TRANSPORTATION INSECURITY: IN THE PAST 12 MONTHS, HAS LACK OF TRANSPORTATION KEPT YOU FROM MEDICAL APPOINTMENTS OR FROM GETTING MEDICATIONS?: NO

## 2025-04-01 SDOH — ECONOMIC STABILITY: FOOD INSECURITY: WITHIN THE PAST 12 MONTHS, THE FOOD YOU BOUGHT JUST DIDN'T LAST AND YOU DIDN'T HAVE MONEY TO GET MORE.: NEVER TRUE

## 2025-04-01 ASSESSMENT — COGNITIVE AND FUNCTIONAL STATUS - GENERAL
MOBILITY SCORE: 24
DAILY ACTIVITIY SCORE: 24
PATIENT BASELINE BEDBOUND: NO
DAILY ACTIVITIY SCORE: 24
MOBILITY SCORE: 24
PATIENT BASELINE BEDBOUND: NO

## 2025-04-01 ASSESSMENT — LIFESTYLE VARIABLES
HOW OFTEN DO YOU HAVE A DRINK CONTAINING ALCOHOL: NEVER
AUDIT-C TOTAL SCORE: 0
SKIP TO QUESTIONS 9-10: 1
HOW MANY STANDARD DRINKS CONTAINING ALCOHOL DO YOU HAVE ON A TYPICAL DAY: PATIENT DOES NOT DRINK
HOW OFTEN DO YOU HAVE 6 OR MORE DRINKS ON ONE OCCASION: NEVER
AUDIT-C TOTAL SCORE: 0

## 2025-04-01 ASSESSMENT — ACTIVITIES OF DAILY LIVING (ADL)
ADL_ASSISTANCE: INDEPENDENT
ADEQUATE_TO_COMPLETE_ADL: YES
BATHING_ASSISTANCE: INDEPENDENT
HEARING - LEFT EAR: FUNCTIONAL
WALKS IN HOME: INDEPENDENT
LACK_OF_TRANSPORTATION: NO
TOILETING: INDEPENDENT
HEARING - RIGHT EAR: FUNCTIONAL
FEEDING YOURSELF: INDEPENDENT
LACK_OF_TRANSPORTATION: NO
PATIENT'S MEMORY ADEQUATE TO SAFELY COMPLETE DAILY ACTIVITIES?: YES
JUDGMENT_ADEQUATE_SAFELY_COMPLETE_DAILY_ACTIVITIES: YES
BATHING: INDEPENDENT
DRESSING YOURSELF: INDEPENDENT
GROOMING: INDEPENDENT
LACK_OF_TRANSPORTATION: NO

## 2025-04-01 ASSESSMENT — ENCOUNTER SYMPTOMS
ENDOCRINE NEGATIVE: 1
ALLERGIC/IMMUNOLOGIC NEGATIVE: 1
GASTROINTESTINAL NEGATIVE: 1
EYES NEGATIVE: 1
PSYCHIATRIC NEGATIVE: 1
WEAKNESS: 1
CONSTITUTIONAL NEGATIVE: 1
COUGH: 1
HEMATOLOGIC/LYMPHATIC NEGATIVE: 1
MUSCULOSKELETAL NEGATIVE: 1
CARDIOVASCULAR NEGATIVE: 1

## 2025-04-01 ASSESSMENT — PATIENT HEALTH QUESTIONNAIRE - PHQ9
2. FEELING DOWN, DEPRESSED OR HOPELESS: NOT AT ALL
SUM OF ALL RESPONSES TO PHQ9 QUESTIONS 1 & 2: 0
1. LITTLE INTEREST OR PLEASURE IN DOING THINGS: NOT AT ALL

## 2025-04-01 ASSESSMENT — PAIN - FUNCTIONAL ASSESSMENT
PAIN_FUNCTIONAL_ASSESSMENT: 0-10
PAIN_FUNCTIONAL_ASSESSMENT: 0-10

## 2025-04-01 ASSESSMENT — PAIN SCALES - GENERAL
PAINLEVEL_OUTOF10: 0 - NO PAIN
PAINLEVEL_OUTOF10: 0 - NO PAIN

## 2025-04-01 NOTE — ED PROVIDER NOTES
HPI   Chief Complaint   Patient presents with   • Syncope     Patient daughter states her mother passed out sitting at the table about an hour and a half prior to arrival. She states she had felt off all day. The daughter says she was unresponsive for about one minute and then was a little confused afterwards. The patient does have dementia and her daughter says she is always somewhat confused. She denies any pain or sick symptoms        History of present illness:  74-year-old female presents emergency room for complaints of syncopal episode.  The patient is accompanied by her daughters who provide the primary history.  They state that the patient has been having complaints of near syncope for the past few days or possibly past week.  They state that today she was sitting at dinner and that they were going to get food for her and they state that apparently she passed out.  She apparently told her daughters that she did not feel well and then apparently slumped forward into the chair and onto the table.  They state that she was only out for a couple of minutes before she came around.  They state that she does have a history of dementia and that she is appears to be becoming increasingly confused more recently.  The patient this time does not recall any prior chest pain or palpitations or shortness of breath prior to any of these episodes occurring does not recall anything else.  She states she does not recall much before this happened.  The daughter states that she has been diagnosed with TIAs in the past as well and they state that she takes a daily baby aspirin but nothing else.    Social history: Negative for alcohol and drug use.    Review of systems:   Gen.: No weight loss, fatigue, anorexia, insomnia, fever.   Eyes: No vision loss, double vision, drainage, eye pain.   ENT: No pharyngitis, dry mouth.   Cardiac: No chest pain, palpitations  Pulmonary: No shortness of breath, cough, hemoptysis.   Heme/lymph: No  swollen glands, fever, bleeding.   GI: No abdominal pain, change in bowel habits, melena, hematemesis, hematochezia, nausea, vomiting, diarrhea.   : No discharge, dysuria, frequency, urgency, hematuria.   Musculoskeletal: No limb pain, joint pain, joint swelling.   Skin: No rashes.   Review of systems is otherwise negative unless stated above or in history of present illness.        Physical exam:  General: Vitals noted, no distress. Afebrile.   EENT: No lymphadenopathy appreciated  Cardiac: Regular, rate, rhythm, no murmur.   Pulmonary: Lungs clear bilaterally with good aeration. No adventitious breath sounds.   Abdomen: Soft, nonsurgical. Nontender. No peritoneal signs. Normoactive bowel sounds.   Extremities: No peripheral edema.   Skin: No rash.   Neuro: No focal neurologic deficits, the patient is slow to respond to questions at this time but otherwise is able to answer questions appropriately.      Medical decision making:   Testing: Urinalysis negative troponin was 5 CMP unremarkable CBC showed white count 11.9 CT scan head is pending at this time    EKG taken on March 31, 2025 at 2022 show sinus rhythm at 69 no STEMI no T wave inversion normal axis is interpreted by myself      Plan: 74-year-old female presents emergency room for complaints of syncopal episode.  The patient is accompanied by her daughters who provide the primary history.  They state that the patient has been having complaints of near syncope for the past few days or possibly past week.  They state that today she was sitting at dinner and that they were going to get food for her and they state that apparently she passed out.  She apparently told her daughters that she did not feel well and then apparently slumped forward into the chair and onto the table.  They state that she was only out for a couple of minutes before she came around.  They state that she does have a history of dementia and that she is appears to be becoming increasingly  confused more recently.  The patient this time does not recall any prior chest pain or palpitations or shortness of breath prior to any of these episodes occurring does not recall anything else.  She states she does not recall much before this happened.  The daughter states that she has been diagnosed with TIAs in the past as well and they state that she takes a daily baby aspirin but nothing else.Neuro: No focal neurologic deficits, the patient is slow to respond to questions at this time but otherwise is able to answer questions appropriately.  The patient does not appear to be in any acute distress at this time is answering questions at this time.  Vitals are normal at this time as well with exception of some hypertension.  The care of the patient was transitioned to oncoming attending physician at this time pending further test results.     Impression:   1.  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   • Cyst of Bartholin's gland     Bartholin cyst   • Mixed incontinence 02/02/2017    Urinary incontinence, mixed   • Overactive bladder     OAB (overactive bladder)   • Personal history of malignant neoplasm of other parts of uterus 11/10/2017    History of malignant neoplasm of endometrium   • Personal history of other diseases of the female genital tract 11/21/2016    History of postmenopausal bleeding   • Personal history of other diseases of urinary system     History of prolapse of bladder   • Personal history of transient ischemic attack (TIA), and cerebral infarction without residual deficits     History of transient cerebral ischemia   • Varicella without complication     Varicella without complication     Past Surgical History:   Procedure Laterality Date   • HYSTEROSCOPY  11/10/2017    Hysteroscopy With Endometrial Ablation   • OTHER SURGICAL HISTORY   2016    Dental Surgery   • TOTAL ABDOMINAL HYSTERECTOMY W/ BILATERAL SALPINGOOPHORECTOMY  11/10/2017    Total Abdominal Hysterectomy With Removal Of Both Ovaries     Family History   Problem Relation Name Age of Onset   • Hypertension Father     • Hypertension Sister     • Heart disease Sister     • Heart attack Sister     • Diabetes Paternal Grandfather     • Other (gynecological cancer) Other aunt      Social History     Tobacco Use   • Smoking status: Former     Current packs/day: 0.00     Average packs/day: 1 pack/day for 10.0 years (10.0 ttl pk-yrs)     Types: Cigarettes     Start date: 1985     Quit date: 1995     Years since quittin.2     Passive exposure: Past   • Smokeless tobacco: Never   Vaping Use   • Vaping status: Never Used   Substance Use Topics   • Alcohol use: Yes     Alcohol/week: 2.0 standard drinks of alcohol     Types: 2 Glasses of wine per week   • Drug use: Never       Physical Exam   ED Triage Vitals   Temperature Heart Rate Respirations BP   25   36.9 °C (98.4 °F) 66 (!) 21 168/71      Pulse Ox Temp Source Heart Rate Source Patient Position   25   95 % Oral Monitor Sitting      BP Location FiO2 (%)     25 --     Right arm        Physical Exam      ED Course & MDM   ED Course as of 03/31/25 2354   Mon Mar 31, 2025   2241 POCT Glucose(!): 109 [PC]      ED Course User Index  [PC] Jose Juan Hernandez MD         Diagnoses as of 25 235   Syncope and collapse                 No data recorded     [place] Coma Scale Score: 14 (25 : Olivia Mckee RN)                           Medical Decision Making      Procedure  Procedures     Don Sharma PA-C  25       Don Sharma PA-C  25

## 2025-04-01 NOTE — CARE PLAN
"The patient's goals for the shift include  \"ready to go home\"    The clinical goals for the shift include Patient's neuro checks will remain consistent throughout shift    MRI today, had tele neuro consult.  ECHO done.  Awaiting discharge order  "

## 2025-04-01 NOTE — DISCHARGE INSTR - OTHER ORDERS
Thank you for choosing [facility] for your Health Care needs.  Also, thank you for allowing us to take you and your families preferences into account when determining your discharge plan.  Stay well!     You may receive a survey in the mail within the next couple weeks. Please take the time to complete it and return it. Your input is ALWAYS important to us. Thank you!  Your Care Transition Team - Ariane Nieves  & Katiuska      For questions about your medications listed on your discharge instructions, please call the Nurses Station at [phone].

## 2025-04-01 NOTE — ED TRIAGE NOTES
Patient daughter states her mother passed out sitting at the table about an hour and a half prior to arrival. She states she had felt off all day. The daughter says she was unresponsive for about one minute and then was a little confused afterwards. The patient does have dementia and her daughter says she is always somewhat confused. She denies any pain or sick symptoms

## 2025-04-01 NOTE — PROGRESS NOTES
04/01/25 0918   Discharge Planning   Living Arrangements Children;Family members  (with daughter and son in law)   Support Systems Children;Family members   Assistance Needed Patient lives with her daughter and son in law in a 1 story house.  They have horses, goats, dogs, and cats.   She is independent with ADLs, IADLs, ambulation and doesn't drive.  She doesn't have DME.  She enjoys going camping at [place] in the summer.   Type of Residence Private residence   Number of Stairs to Enter Residence 0   Number of Stairs Within Residence 2   Do you have animals or pets at home? Yes   Type of Animals or Pets horses, dogs, cats, goats   Home or Post Acute Services None   Expected Discharge Disposition Home   Does the patient need discharge transport arranged? No   Financial Resource Strain   How hard is it for you to pay for the very basics like food, housing, medical care, and heating? Not hard   Housing Stability   In the last 12 months, was there a time when you were not able to pay the mortgage or rent on time? N   In the past 12 months, how many times have you moved where you were living? 0   At any time in the past 12 months, were you homeless or living in a shelter (including now)? N   Transportation Needs   In the past 12 months, has lack of transportation kept you from medical appointments or from getting medications? no   In the past 12 months, has lack of transportation kept you from meetings, work, or from getting things needed for daily living? No   Stroke Family Assessment   Stroke Family Assessment Needed No   Intensity of Service   Intensity of Service >30 min     Confirmed address and pharmacy.  PCP is Dr. Wilson Dillard.      3:30 pm  [facility] called and explained HUGHES letter to patient and daughter.  Obtained IPAD signature from daughter. Patient medically ready for discharge.  Patient follow up information on discharge instructions.  Patient will be returning home. Patient and family are in  agreement with discharge plan.      DC PLAN:  Home     Dr. Arthur Dr. Boyd Dan Dr Kumar

## 2025-04-01 NOTE — PROGRESS NOTES
Occupational Therapy    Evaluation    Patient Name: Meryl Leonard  MRN: [id number]  Department: GEN  NONV1  Room: [address] [place], [place] [address]  Today's Date: 4/1/2025  Time Calculation  Start Time: 0804  Stop Time: 0819  Time Calculation (min): 15 min    Assessment  IP OT Assessment  OT Assessment: Pt. is a 74 y.o. female referred to occupational therapy for imapried self-care 2/2 admisison for TIA and syncope. Pt. appears to be at their baseline and does not require further OT services at this time. Daughter and pt. agree.  Prognosis: Good  Barriers to Discharge Home: No anticipated barriers  Evaluation/Treatment Tolerance: Patient tolerated treatment well  Medical Staff Made Aware: Yes  End of Session Communication: Bedside nurse  End of Session Patient Position: Bed, 2 rail up (alarm off daughter present, pt. sitting at EOB RN aware. Daughter advised to alert RN of departure.)  Plan:  No Skilled OT: At baseline function  OT Frequency: OT eval only  OT Discharge Recommendations: No further acute OT, No OT needed after discharge  OT - OK to Discharge: Yes (Based on completed evaluation and care plan recommendations, no barriers to discharge to next site of care.)    Subjective   Current Problem:  1. Syncope and collapse        2. Syncope, unspecified syncope type  Transthoracic Echo (TTE) Complete    Transthoracic Echo (TTE) Complete      3. Abnormal EKG  Transthoracic Echo (TTE) Complete    Transthoracic Echo (TTE) Complete        General:  General  Reason for Referral: impaired self-care d/t admission for TIA. (Pt. was admitted s/p witnessed syncopal episode at table.)  Referred By: Marylou Crow MD  Past Medical History Relevant to Rehab: urinary incontinence, TIA, overactive bladder, atypical endometrial hyperplasia, varicella  Family/Caregiver Present: Yes (daughter)  Prior to Session Communication: Bedside nurse  Patient Position Received: Bed, 2 rail up, Alarm off, not on at start of session  General Comment: Pt. was  pleasant and cooperative with OT session.  Precautions:  Hearing/Visual Limitations: glasses  Medical Precautions: Fall precautions    Pain:  Pain Assessment  Pain Assessment: 0-10  0-10 (Numeric) Pain Score: 0 - No pain    Objective   Cognition:  Overall Cognitive Status: Impaired at baseline  Arousal/Alertness: Appropriate responses to stimuli  Orientation Level: Disoriented to person, Disoriented to situation, Disoriented to time (not able to confirm birthdate; reason for admission or time)    Home Living:  Type of Home: House  Lives With: Adult children  Home Layout: One level  Home Access: Stairs to enter with rails  Entrance Stairs-Number of Steps: 2   Prior Function:  Level of [place]: Independent with ADLs and functional transfers, Independent with homemaking with ambulation  Receives Help From: Family  ADL Assistance: Independent  Homemaking Assistance: Independent  Ambulatory Assistance: Independent  IADL History:  Homemaking Responsibilities: Yes  Meal Prep Responsibility: Secondary  Laundry Responsibility: Secondary  Cleaning Responsibility: Secondary  Bill Paying/Finance Responsibility: Secondary  Shopping Responsibility: Secondary  Current License: No  ADL:  Eating Assistance: Independent (anticipated)  Grooming Assistance: Independent (anticipated)  Bathing Assistance: Independent (anticipated)  UE Dressing Assistance: Independent (anticiapted)  LE Dressing Assistance: Independent  LE Dressing Deficit: Don/doff R sock, Don/doff L sock  Toileting Assistance with Device: Independent  Toileting Deficit: Grab bar use  Activity Tolerance:  Endurance: Endurance does not limit participation in activity  Bed Mobility/Transfers: Bed Mobility  Bed Mobility: Yes    Transfers  Transfer: Yes  Transfer 1  Transfer From 1: Bed to  Transfer to 1: Stand, Commode-standard  Technique 1: Sit to stand, Stand to sit  Transfer Level of Assistance 1: Independent  Transfers 2  Transfer From 2: Toilet to  Transfer to 2:  Bed  Technique 2: Sit to stand, Stand to sit  Transfer Level of Assistance 2: Independent  Functional Mobility:  Functional Mobility  Functional Mobility Performed: Yes  Functional Mobility 1  Surface 1: Level tile  Device 1: No device  Assistance 1: Independent  Comments 1: able to walk without assistance, no LOB  Sitting Balance:  Static Sitting Balance  Static Sitting-Balance Support: Feet supported  Static Sitting-Level of Assistance: Independent  Dynamic Sitting Balance  Dynamic Sitting-Balance Support: Feet supported  Dynamic Sitting-Level of Assistance: Independent  Dynamic Sitting-Balance: Forward lean  Dynamic Sitting-Comments: righting reactions intact  Standing Balance:  Static Standing Balance  Static Standing-Balance Support: No upper extremity supported  Static Standing-Level of Assistance: Independent  Dynamic Standing Balance  Dynamic Standing-Balance Support: No upper extremity supported  Dynamic Standing-Level of Assistance: Independent  Dynamic Standing-Balance: Turning  IADL's:   Homemaking Responsibilities: Yes  Meal Prep Responsibility: Secondary  Laundry Responsibility: Secondary  Cleaning Responsibility: Secondary  Bill Paying/Finance Responsibility: Secondary  Shopping Responsibility: Secondary  Current License: No  Vision: Vision - Basic Assessment  Current Vision: Wears glasses all the time  Sensation:  Sensation Comment: denies deficits  Strength:  Strength Comments: BUE: grossly 4+/5 throughout  Coordination:  Movements are Fluid and Coordinated: Yes   Hand Function:  Hand Function  Gross Grasp: Functional  Coordination: Functional  Extremities: RUE   RUE : Within Functional Limits and LUE   LUE: Within Functional Limits    Outcome Measures: [facility] Daily Activity  Putting on and taking off regular lower body clothing: None  Bathing (including washing, rinsing, drying): None  Putting on and taking off regular upper body clothing: None  Toileting, which includes using toilet, bedpan or  urinal: None  Taking care of personal grooming such as brushing teeth: None  Eating Meals: None  Daily Activity - Total Score: 24    Education Documentation  Body Mechanics, taught by Abiola Watkins OT at 4/1/2025  9:03 AM.  Learner: Patient  Readiness: Acceptance  Method: Explanation  Response: Verbalizes Understanding  Comment: [place] on POC and safety awareness.    ADL Training, taught by Abiola Watkins OT at 4/1/2025  9:03 AM.  Learner: Patient  Readiness: Acceptance  Method: Explanation  Response: Verbalizes Understanding  Comment: Edu on POC and safety awareness.    Education Comments  No comments found.      Goals: Not established patient at her baseline.

## 2025-04-01 NOTE — PROGRESS NOTES
Physical Therapy                 Therapy Communication Note    Patient Name: Meryl Leonard  MRN: [id number]  Department: GEN  NON  Room: [address] [place], [place] [address]  Today's Date: 4/1/2025     Discipline: Physical Therapy          Missed Visit Reason: Missed Visit Reason:  (pt. off floor)    Missed Time: Attempt 948

## 2025-04-01 NOTE — H&P
"History Of Present Illness  Meryl Leonard is a 74 y.o. female with past medical history of dementia, TIA, overactive bladder who presented to the emergency department yesterday after a syncopal episode at home.  Her daughter stated that she passed out while sitting at the table 1-1/2 hours prior to arrival.  Patient had stated she felt \"off\" all day.  When she was syncopal her daughter stated she was unresponsive for about 1 minute then confused afterwards she does have baseline confusion but has been getting progressively worse more recently.  She was not incontinent of urine or stool and did not appear to have seizure activity.  She denied headache, blurred vision, chest pain, palpitations, nausea, vomiting, diarrhea and had no focal neurodeficit in the emergency department.  Daughter also stated the patient had complained of near syncope for the past few days as well.  Her lab workup showed leukocytosis, urinalysis negative for signs of infection, CT head showed no acute process.  She was subsequently admitted to Medicine for observation, further treatment and evaluation and close monitoring of hemodynamic status.     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    Bladder prolapse, female, acquired     Cyst of Bartholin's gland     Disease of thyroid gland     Diverticulosis     Hearing loss     Hypertension     Memory loss     Mixed incontinence 02/02/2017    Overactive bladder     Personal history of malignant neoplasm of other parts of uterus 11/10/2017    Personal history of transient ischemic attack (TIA), and cerebral infarction without residual deficits     Postmenopausal bleeding     Varicella without complication      Surgical History  Past Surgical History: "   Procedure Laterality Date    DENTAL SURGERY      ENDOMETRIAL ABLATION      TOTAL ABDOMINAL HYSTERECTOMY W/ BILATERAL SALPINGOOPHORECTOMY  11/10/2017      Social History  She reports that she quit smoking about 30 years ago. Her smoking use included cigarettes. She started smoking about 40 years ago. She has a 10 pack-year smoking history. She has been exposed to tobacco smoke. She has never used smokeless tobacco. She reports current alcohol use of about 2.0 standard drinks of alcohol per week. She reports that she does not use drugs.    Family History  Family History   Problem Relation Name Age of Onset    Hypertension Father      Hypertension Sister      Heart disease Sister      Heart attack Sister      Diabetes Paternal Grandfather      Other (gynecological cancer) Other aunt      Allergies  Meperidine    Review of Systems   Constitutional: Negative.    HENT: Negative.     Eyes: Negative.    Respiratory:  Positive for cough.    Cardiovascular: Negative.    Gastrointestinal: Negative.    Endocrine: Negative.    Genitourinary: Negative.    Musculoskeletal: Negative.    Allergic/Immunologic: Negative.    Neurological:  Positive for syncope and weakness.   Hematological: Negative.    Psychiatric/Behavioral: Negative.          Physical Exam  Constitutional:       General: She is not in acute distress.     Appearance: Normal appearance. She is not toxic-appearing.   HENT:      Head: Normocephalic and atraumatic.      Mouth/Throat:      Mouth: Mucous membranes are moist.   Eyes:      Extraocular Movements: Extraocular movements intact.      Pupils: Pupils are equal, round, and reactive to light.   Cardiovascular:      Rate and Rhythm: Normal rate and regular rhythm.      Pulses: Normal pulses.      Heart sounds: Normal heart sounds. No murmur heard.     No gallop.   Pulmonary:      Effort: Pulmonary effort is normal. No respiratory distress.      Breath sounds: Normal breath sounds. No wheezing, rhonchi or rales.    Abdominal:      General: Bowel sounds are normal. There is no distension.      Palpations: Abdomen is soft.      Tenderness: There is no abdominal tenderness. There is no guarding or rebound.   Musculoskeletal:         General: No swelling, tenderness, deformity or signs of injury. Normal range of motion.      Cervical back: Normal range of motion and neck supple.   Skin:     General: Skin is warm and dry.      Capillary Refill: Capillary refill takes less than 2 seconds.      Coloration: Skin is not jaundiced.      Findings: No bruising or rash.   Neurological:      General: No focal deficit present.      Mental Status: She is alert. Mental status is at baseline. She is disoriented.      Cranial Nerves: No cranial nerve deficit.      Sensory: No sensory deficit.      Motor: No weakness.      Gait: Gait normal.   Psychiatric:         Mood and Affect: Mood normal.         Behavior: Behavior normal.          Last Recorded Vitals  Blood pressure 179/80, pulse 79, temperature 36.6 °C (97.9 °F), temperature source Temporal, resp. rate 17, height 1.524 m (5'), weight 67.1 kg (147 lb 14.9 oz), SpO2 92%.    Scheduled medications  [START ON 4/2/2025] aspirin, 81 mg, oral, Daily  clopidogrel, 75 mg, oral, Daily  donepezil, 10 mg, oral, Nightly  heparin (porcine), 5,000 Units, subcutaneous, q8h  levothyroxine, 75 mcg, oral, Daily  metoprolol succinate XL, 50 mg, oral, Daily  pravastatin, 20 mg, oral, Nightly     PRN medications  PRN medications: labetaloL, oxygen    Relevant Results  CT head wo IV contrast  Result Date: 3/31/2025  No evidence of acute cortical infarct or intracranial hemorrhage.     MACRO: None   Signed by: Trent Perez 3/31/2025 10:26 PM Dictation workstation:   [id number]     MR brain wo IV contrast  MR angio neck wo IV contrast  MR angio head wo IV contrast  Result Date: 4/1/2025  There is no MRI evidence of acute infarction on the diffusion weighted images.   There is again evidence of moderate brain  parenchymal volume loss.   Nonspecific white matter changes are again noted within cerebral hemispheres bilaterally as well as mild ill-defined increased signal on the FLAIR and T2 weighted images overlying the brainstem which while nonspecific, given the patient's age, likely represent sequelae of more remote small-vessel ischemic change. Additional small foci of bright signal on the T2 weighted images are again noted within the subinsular regions, basal ganglia, and thalami bilaterally suggesting incidental mildly prominent perivascular spaces and/or small scattered more remote lacunar infarctions.   The MRA of the neck demonstrates no significant stenosis along the carotid bifurcations. No significant stenosis is noted along the visualized cervical segments of the vertebral arteries.   The intracranial MRA demonstrates a right dominant A2 and more distal right anterior cerebral when compared with the left likely representing a congenital variation of normal. No significant intracranial stenosis or intracranial aneurysm is noted.     MACRO: None.   Signed by: Rom Clark 4/1/2025 10:35 AM Dictation workstation:   [id number]        Latest Reference Range & Units 03/31/25 21:01 03/31/25 22:37 04/01/25 05:58   GLUCOSE 74 - 99 mg/dL 106 (H)  122 (H)   SODIUM 136 - 145 mmol/L 136  138   POTASSIUM 3.5 - 5.3 mmol/L 4.3  4.0   CHLORIDE 98 - 107 mmol/L 99  104   Bicarbonate 21 - 32 mmol/L 28  27   Anion Gap 10 - 20 mmol/L 13  11   Blood Urea Nitrogen 6 - 23 mg/dL 16  11   Creatinine 0.50 - 1.05 mg/dL 1.01  0.75   EGFR >60 mL/min/1.73m*2 58 (L)  83   Calcium 8.6 - 10.3 mg/dL 9.7  9.0   Albumin 3.4 - 5.0 g/dL 4.3  3.8   Alkaline Phosphatase 33 - 136 U/L 103  84   ALT 7 - 45 U/L 19  16   AST 9 - 39 U/L 24  18   Bilirubin Total 0.0 - 1.2 mg/dL 0.5  0.7   HDL CHOLESTEROL mg/dL   76.1   Cholesterol/HDL Ratio    2.8   LDL Calculated <=99 mg/dL   120 (H)   VLDL 0 - 40 mg/dL   19   TRIGLYCERIDES 0 - 149 mg/dL   97   Non HDL  Cholesterol 0 - 149 mg/dL   139   Total Protein 6.4 - 8.2 g/dL 7.1  6.1 (L)   MAGNESIUM 1.60 - 2.40 mg/dL   1.77   CHOLESTEROL 0 - 199 mg/dL   215 (H)   BNP 0 - 99 pg/mL   221 (H)   Troponin I, High Sensitivity 0 - 13 ng/L 5 5    WBC 4.4 - 11.3 x10*3/uL 11.9 (H)  8.0   nRBC 0.0 - 0.0 /100 WBCs 0.0  0.0   RBC 4.00 - 5.20 x10*6/uL 4.30  4.07   HEMOGLOBIN 12.0 - 16.0 g/dL 13.5  12.5   HEMATOCRIT 36.0 - 46.0 % 41.0  37.8   MCV 80 - 100 fL 95  93   MCH 26.0 - 34.0 pg 31.4  30.7   MCHC 32.0 - 36.0 g/dL 32.9  33.1   RED CELL DISTRIBUTION WIDTH 11.5 - 14.5 % 13.2  13.0   Platelets 150 - 450 x10*3/uL 264  223     Assessment/Plan   Assessment & Plan  TIA (transient ischemic attack)    Benign essential HTN    Dementia    Hyperlipidemia    Hypothyroid    Syncope      TIA (transient ischemic attack)  Syncope  - presenting symptoms  - NIH initially  - glucose 106  - Lipid panel: Cholesterol 215, triglycerides 97, , HDL 76  - A1c pending  - CTH: no evidence of acute cortical infarct or intracranial hemorrhage  - MRI/MRA: There is no MRI evidence of acute infarction on the diffusion weighted images.   There is again evidence of moderate brain parenchymal volume loss.   Nonspecific white matter changes are again noted within cerebral hemispheres bilaterally as well as mild ill-defined increased signal on the FLAIR and T2 weighted images overlying the brainstem which while nonspecific, given the patient's age, likely represent sequelae of more remote small-vessel ischemic change. Additional small foci of bright signal on the T2 weighted images are again noted within the subinsular regions, basal ganglia, and thalami bilaterally suggesting incidental mildly prominent perivascular spaces and/or small scattered more remote lacunar infarctions.   The MRA of the neck demonstrates no significant stenosis along the carotid bifurcations. No significant stenosis is noted along the visualized cervical segments of the vertebral arteries.    The intracranial MRA demonstrates a right dominant A2 and more distal right anterior cerebral when compared with the left likely representing a congenital variation of normal. No significant intracranial stenosis or intracranial aneurysm is noted.   - EKG sinus rhythm with PVCs  - Echo: Pending read  - telemetry  - PT/OT evals  - continue Plavix, aspirin, statin  - tele-neuro consult  - neuro checks every 4  - safety precautions     Benign essential HTN  Hyperlipidemia  -  -Troponins 5 > 5  -EKG sinus rhythm with PVCs  -Echo pending today  -Continue aspirin, metoprolol, pravastatin    Dementia  -Continue Aricept    Hypothyroid  -TSH 3.65 in 2024  -Continue levothyroxine    GI ppx: PPI  DVT ppx: Heparin  Fluids: As needed  Electrolytes: replace as needed  Nutrition: Regular  Adjuncts: PIV  Code Status: Full code  Pt requires inpatient stay at this time.      DEVIN Chance-CNP

## 2025-04-01 NOTE — PROGRESS NOTES
Pharmacy Medication History Review    Meryl Leonard is a 74 y.o. female admitted for TIA (transient ischemic attack). Pharmacy reviewed the patient's [id number] medications and allergies for accuracy.    Sources used to confirm medication list: Informant interview  Informant: Child (Marylou)  Informant credibility: Good (Able to recall medication, indication, strength, frequency, and/or prescriber for >75% of medications).    Total number of adjustments: 3     Medications Added Medications Removed Medications Adjusted  Adjustments Made    Flonase      Donepezil 5 MG      Fezolinetant       The list below reflectives the updated PTA list. Please review each medication in order reconciliation for additional clarification and justification.  Medications Prior to Admission   Medication Sig Dispense Refill Last Dose/Taking    aspirin 81 mg EC tablet Take 1 tablet (81 mg) by mouth once daily.   4/1/2025 Morning    cyanocobalamin, vitamin B-12, 1,000 mcg tablet, sublingual Place 1 tablet (1,000 mcg) under the tongue once daily. 90 tablet 1 4/1/2025 Morning    donepezil (Aricept) 10 mg tablet TAKE 1 TABLET BY MOUTH IN THE  EVENING 90 tablet 3 4/1/2025 Morning    levothyroxine (Synthroid, Levoxyl) 75 mcg tablet TAKE 1 TABLET BY MOUTH ONCE  DAILY 90 tablet 3 4/1/2025 Morning    metoprolol succinate XL (Toprol-XL) 50 mg 24 hr tablet TAKE 1 TABLET BY MOUTH ONCE  DAILY 90 tablet 3 4/1/2025 Morning    multivitamin tablet Take 1 tablet by mouth once daily.   4/1/2025 Morning    pravastatin (Pravachol) 20 mg tablet TAKE 1 TABLET BY MOUTH ONCE  DAILY AT BEDTIME 90 tablet 3 4/1/2025 Morning    Veozah 45 mg tablet TAKE 45 MG BY MOUTH ONCE DAILY. (Patient not taking: Reported on 4/1/2025) 30 tablet 3 Not Taking        The list below reflectives the updated allergy list. Please review each documented allergy for additional clarification and justification.  Allergies  Reviewed by Kitty Nolen RN on 4/1/2025        Severity  Reactions Comments    Meperidine Low Hives, Rash, Other             Below are additional concerns with the patient's PTA list.  Spoke w/pt's daughter, Marylou, at bedside. Reviewed PTA and allergy list. Please review changes made to the PTA list in the chart above.    Joelle Engle CPhT  Medication History Pharmacy Technician  DANYELL 8-4:30  Available via [employer] Secure Chat  OR  [phone]

## 2025-04-01 NOTE — NURSING NOTE
Pt @ MRI    Tele neuro consult done @ bedside, dtr present    1507 RACHANA DIPAK Kunz CNP @ bedside, antic discharge    1512 pt/ family updated on discharge, awaiting order

## 2025-04-01 NOTE — CONSULTS
Inpatient consult to Social Work and TCC  Consult performed by: Coleen Millard MD  Consult ordered by: Marylou Crow MD        Virtual Visit start time: 10.50am    History Of Present Illness:  Historian: Patientand family  Meryl Leonard is a 74 y.o. female presenting with syncope    Pt was sitting waiting waiting for dinner, followed by passing out. Pt did not feel well the whole day  Denied any sxs prior to syncope (headache, dizziness, palpitations,   Multiple family members have had a cold during this week. Pt has been eating and drinking well for the last few days, denied diarrhea or GI sxs in the past few days, denied any URI sxs.  This is the first time this happen. Pt remembers the event were she passed out. Family denied urinary/fecal incontinence, tongue biting or blood in mouth.     Pt takes ASA 81 and Donepezil 10mg    Prior Functional Status (Modified Vasu Scale):  1 The patient has no significant disability; able to carry out all pre-stroke activities.    Stroke Risk Factors:  None    Last Recorded Vitals:  Blood pressure 179/80, pulse 79, temperature 36.6 °C (97.9 °F), temperature source Temporal, resp. rate 17, height 1.524 m (5'), weight 67.1 kg (147 lb 14.9 oz), SpO2 92%.     NIHSS:   NIH Stroke Scale:     1A. Level of Consciousness:  Alert (keenly responsive) (0)    1B. Ask Month and Age:  Both questions right (0)    1C. Blink Eyes & Squeeze Hands:  Performs both tasks (0)    2. Best Gaze:  Normal (0)    3. Visual:  No visual loss (0)    4. Facial Palsy:  Normal symmetry (0)    5A. Motor - Left Arm:  No drift (0)    5B. Motor - Right Arm:  No drift (0)    6A. Motor - Left Leg:  No drift (0)    6B. Motor - Right Leg:  No drift (0)    7. Limb Ataxia:  No ataxia (0)    8. Sensory Loss:  Normal (no sensory loss) (0)    9. Best Language:  Mild-moderate aphasia (+1)    10. Dysarthia:  Normal (0)    11. Extinction and Inattention:  No abnormality (0)    NIH Stroke Scale:  1         Relevant  Results:  LABS:  Glucose   Date Value Ref Range Status   04/01/2025 122 (H) 74 - 99 mg/dL Final      Results for orders placed or performed during the hospital encounter of 03/31/25 (from the past 24 hours)   POCT GLUCOSE   Result Value Ref Range    POCT Glucose 109 (H) 74 - 99 mg/dL   CBC and Auto Differential   Result Value Ref Range    WBC 11.9 (H) 4.4 - 11.3 x10*3/uL    nRBC 0.0 0.0 - 0.0 /100 WBCs    RBC 4.30 4.00 - 5.20 x10*6/uL    Hemoglobin 13.5 12.0 - 16.0 g/dL    Hematocrit 41.0 36.0 - 46.0 %    MCV 95 80 - 100 fL    MCH 31.4 26.0 - 34.0 pg    MCHC 32.9 32.0 - 36.0 g/dL    RDW 13.2 11.5 - 14.5 %    Platelets 264 150 - 450 x10*3/uL    Neutrophils % 80.6 40.0 - 80.0 %    Immature Granulocytes %, Automated 0.3 0.0 - 0.9 %    Lymphocytes % 8.4 13.0 - 44.0 %    Monocytes % 9.1 2.0 - 10.0 %    Eosinophils % 1.2 0.0 - 6.0 %    Basophils % 0.4 0.0 - 2.0 %    Neutrophils Absolute 9.59 (H) 1.60 - 5.50 x10*3/uL    Immature Granulocytes Absolute, Automated 0.03 0.00 - 0.50 x10*3/uL    Lymphocytes Absolute 1.00 0.80 - 3.00 x10*3/uL    Monocytes Absolute 1.08 (H) 0.05 - 0.80 x10*3/uL    Eosinophils Absolute 0.14 0.00 - 0.40 x10*3/uL    Basophils Absolute 0.05 0.00 - 0.10 x10*3/uL   Comprehensive metabolic panel   Result Value Ref Range    Glucose 106 (H) 74 - 99 mg/dL    Sodium 136 136 - 145 mmol/L    Potassium 4.3 3.5 - 5.3 mmol/L    Chloride 99 98 - 107 mmol/L    Bicarbonate 28 21 - 32 mmol/L    Anion Gap 13 10 - 20 mmol/L    Urea Nitrogen 16 6 - 23 mg/dL    Creatinine 1.01 0.50 - 1.05 mg/dL    eGFR 58 (L) >60 mL/min/1.73m*2    Calcium 9.7 8.6 - 10.3 mg/dL    Albumin 4.3 3.4 - 5.0 g/dL    Alkaline Phosphatase 103 33 - 136 U/L    Total Protein 7.1 6.4 - 8.2 g/dL    AST 24 9 - 39 U/L    Bilirubin, Total 0.5 0.0 - 1.2 mg/dL    ALT 19 7 - 45 U/L   Troponin I, High Sensitivity, Initial   Result Value Ref Range    Troponin I, High Sensitivity 5 0 - 13 ng/L   ECG 12 lead   Result Value Ref Range    Ventricular Rate 69 BPM     Atrial Rate 69 BPM    KS Interval 158 ms    QRS Duration 72 ms    QT Interval 390 ms    QTC Calculation(Bazett) 417 ms    P Axis 47 degrees    R Axis 27 degrees    T Axis 91 degrees    QRS Count 12 beats    Q Onset 223 ms    P Onset 144 ms    P Offset 208 ms    T Offset 418 ms    QTC Fredericia 408 ms   Urinalysis with Reflex Culture and Microscopic   Result Value Ref Range    Color, Urine Yellow Light-Yellow, Yellow, Dark-Yellow    Appearance, Urine Clear Clear    Specific Gravity, Urine 1.008 1.005 - 1.035    pH, Urine 6.5 5.0, 5.5, 6.0, 6.5, 7.0, 7.5, 8.0    Protein, Urine NEGATIVE NEGATIVE, 10 (TRACE), 20 (TRACE) mg/dL    Glucose, Urine Normal Normal mg/dL    Blood, Urine NEGATIVE NEGATIVE mg/dL    Ketones, Urine NEGATIVE NEGATIVE mg/dL    Bilirubin, Urine NEGATIVE NEGATIVE mg/dL    Urobilinogen, Urine Normal Normal mg/dL    Nitrite, Urine NEGATIVE NEGATIVE    Leukocyte Esterase, Urine NEGATIVE NEGATIVE   Extra Urine Gray Tube   Result Value Ref Range    Extra Tube Hold for add-ons.    Troponin, High Sensitivity, 1 Hour   Result Value Ref Range    Troponin I, High Sensitivity 5 0 - 13 ng/L   Electrocardiogram, 12-lead PRN ACS symptoms   Result Value Ref Range    Ventricular Rate 88 BPM    Atrial Rate 88 BPM    KS Interval 156 ms    QRS Duration 78 ms    QT Interval 378 ms    QTC Calculation(Bazett) 457 ms    P Axis 68 degrees    R Axis 46 degrees    T Axis 84 degrees    QRS Count 14 beats    Q Onset 225 ms    P Onset 147 ms    P Offset 209 ms    T Offset 414 ms    QTC Fredericia 429 ms   Lipid Panel   Result Value Ref Range    Cholesterol 215 (H) 0 - 199 mg/dL    HDL-Cholesterol 76.1 mg/dL    Cholesterol/HDL Ratio 2.8     LDL Calculated 120 (H) <=99 mg/dL    VLDL 19 0 - 40 mg/dL    Triglycerides 97 0 - 149 mg/dL    Non HDL Cholesterol 139 0 - 149 mg/dL   B-Type Natriuretic Peptide   Result Value Ref Range     (H) 0 - 99 pg/mL   Magnesium   Result Value Ref Range    Magnesium 1.77 1.60 - 2.40 mg/dL   CBC    Result Value Ref Range    WBC 8.0 4.4 - 11.3 x10*3/uL    nRBC 0.0 0.0 - 0.0 /100 WBCs    RBC 4.07 4.00 - 5.20 x10*6/uL    Hemoglobin 12.5 12.0 - 16.0 g/dL    Hematocrit 37.8 36.0 - 46.0 %    MCV 93 80 - 100 fL    MCH 30.7 26.0 - 34.0 pg    MCHC 33.1 32.0 - 36.0 g/dL    RDW 13.0 11.5 - 14.5 %    Platelets 223 150 - 450 x10*3/uL   Comprehensive Metabolic Panel   Result Value Ref Range    Glucose 122 (H) 74 - 99 mg/dL    Sodium 138 136 - 145 mmol/L    Potassium 4.0 3.5 - 5.3 mmol/L    Chloride 104 98 - 107 mmol/L    Bicarbonate 27 21 - 32 mmol/L    Anion Gap 11 10 - 20 mmol/L    Urea Nitrogen 11 6 - 23 mg/dL    Creatinine 0.75 0.50 - 1.05 mg/dL    eGFR 83 >60 mL/min/1.73m*2    Calcium 9.0 8.6 - 10.3 mg/dL    Albumin 3.8 3.4 - 5.0 g/dL    Alkaline Phosphatase 84 33 - 136 U/L    Total Protein 6.1 (L) 6.4 - 8.2 g/dL    AST 18 9 - 39 U/L    Bilirubin, Total 0.7 0.0 - 1.2 mg/dL    ALT 16 7 - 45 U/L   POCT GLUCOSE   Result Value Ref Range    POCT Glucose 128 (H) 74 - 99 mg/dL        CT Head Imaging:  [facility] imaging personally reviewed, showed no acute ischemic / hemorrhagic changes     CTA Head and Neck Imaging:  CTA imaging not performed     CT Perfusion Imaging:  Not performed.    Assessment:  Stroke not suspected, alternative etiology likely   Syncopal event of unclear etiology, ddx includes cardiac arrhythmia vs. Dehydration from possible viral illness (multiple family members with viral sxs)    Pt also has neurodegenerative disease, suspected Alzheimer's dementia, this event could possibly be an amyloid spell.  Other ddx includes Donepezil side effect.     IV Thrombolysis IV Thrombolysis Checklist        IV Thrombolysis Given: No; Thrombolysis contraindication reason: Working diagnosis is NOT a suspected ischemic stroke          Patient is a candidate for thrombectomy:  yes/no: No; contraindication reason: No evidence of proximal occlusion    Additional Recommendations:  Please obtain EKG  Please place patient on  telemetry. If already on telemetry, review for arrhthymias.   No need for EEG at this time, etiology less likely to be epileptic.  If episodes continue to happen, would consider follow-up with neurocognitive clinic (established pt of Dr. Dorsey) to consider decreasing Donepezil dose.      Disposition:  Patient will remain at referring facility for further evaluation and management.    Virtual or Telephone Consent    An interactive audio and video telecommunication system which permits real time communications between the patient (at the originating site) and provider (at the distant site) was utilized to provide this telehealth service.   Verbal consent was requested and obtained from Meryl Leonard on this date, 04/01/25 for a telehealth visit.      Telestroke is covered in shift work. If there are further Neurological questions or concerns please contact your regional neurologist on call during daytime hours or contact the transfer center with an ADT20 order.    Pt seen and discussed with attending Dr. Alejo Millard MD  PGY-5 Vascular Neurology Fellow

## 2025-04-01 NOTE — PROGRESS NOTES
Physical Therapy                 Therapy Communication Note    Patient Name: Meryl Leonard  MRN: [id number]  Department: [facility]  Room: [address] [place], [place] [address]  Today's Date: 4/1/2025     Discipline: Physical Therapy          Missed Visit Reason: Missed Visit Reason:  PT screen. PT order received; chart reviewed. Observed pt. IND with amb. in hallway with normal balance (no LOB with reach to floor or quick stop). Reports no strength, vision, sensation deficits. Pt. does have chronic L shoulder impairment/pain and may benefit from follow-up outpatient therapy for this. No further acute PT needs at this time.     PT screen 11:25-11:30

## 2025-04-01 NOTE — ED PROVIDER NOTES
I saw and evaluated the patient. I have personally performed a substantive portion of the encounter and have reviewed the resident´s/RACHANA documentation and discussed the patient with the provider.  Please see below for further details.    Subjective:  Patient is a 74-year-old woman who was sitting at the dinner table tonight when she suddenly collapsed unconscious for about a minute.  She was not incontinent of urine or stool.  She had no seizure activity.  She was mildly confused for few seconds after opening her eyes but then went back to her baseline.  Patient is has some problems with memory loss has been seen by neurology for this.  She denies any recent change in medication or diet.  She has had no headache blurred vision chest pain, palpitations, abdominal pain, nausea, vomiting, diarrhea or focal neurocomplaints.  She has had a previous CT scan as part of her memory loss workup which showed some old infarcts.  There has been no previous syncopal events.    Objective:  Visit Vitals  /81 (BP Location: Right arm, Patient Position: Sitting)   Pulse 70   Temp 36.9 °C (98.4 °F) (Oral)   Resp 16   Ht 1.524 m (5')   Wt 61.2 kg (135 lb)   SpO2 96%   BMI 26.37 kg/m²   OB Status Postmenopausal   Smoking Status Former   BSA 1.61 m²      Patient is alert and oriented to person but not place or date.  Head is atraumatic normocephalic  Neck is supple nontender, no carotid bruits, normal carotid pulses  Heart tones are normal and regular lung sounds are clear and equal  Abdomen is benign  Patient is speaking normally.  She has no change in vision.  She has no change in strength or sensation.    EG shows a sinus rhythm acute ST-T wave changes.  CT scan shows no acute pathology  Urinalysis shows no evidence of infection  Electrolytes demonstrate a glucose of 106 and a GFR of 58 but otherwise are normal including the LFTs        Assessment:  Syncope    Plan:  Admit    LIAM Hernandez MD  03/31/25  2138       Jose Juan Hernandez MD  03/31/25 2345       Jose Juan Hernandez MD  04/02/25 0701

## 2025-04-01 NOTE — CARE PLAN
The patient's goals for the shift include  resting    The clinical goals for the shift include Patient's neuro checks will remain consistent throughout shift    Patient admitted to unit this shift. Patient is a+ox1-2 with hx of dementia. Neuros remain consistent. At 0502, patient's tele read a period of asystole <1 minute. Vital signs and EKG obtained. Overnight provider notified of episode. Patient denies any abnormalities. Patient's daughter at bedside. Tele currently reading NSR at 77 BPM. Safety measures in place.

## 2025-04-01 NOTE — DISCHARGE SUMMARY
"Discharge Diagnosis  TIA (transient ischemic attack)      Discharge Meds     Medication List      CONTINUE taking these medications     aspirin 81 mg EC tablet   cyanocobalamin (vitamin B-12) 1,000 mcg tablet, sublingual; Place 1   tablet (1,000 mcg) under the tongue once daily.   donepezil 10 mg tablet; Commonly known as: Aricept; TAKE 1 TABLET BY   MOUTH IN THE  EVENING   levothyroxine 75 mcg tablet; Commonly known as: Synthroid, Levoxyl; TAKE   1 TABLET BY MOUTH ONCE  DAILY   metoprolol succinate XL 50 mg 24 hr tablet; Commonly known as:   Toprol-XL; TAKE 1 TABLET BY MOUTH ONCE  DAILY   multivitamin tablet   pravastatin 20 mg tablet; Commonly known as: Pravachol; TAKE 1 TABLET BY   MOUTH ONCE  DAILY AT BEDTIME     STOP taking these medications     Veozah 45 mg tablet; Generic drug: fezolinetant       Test Results Pending At Discharge  Pending Labs       Order Current Status    Hemoglobin A1C In process    Procalcitonin In process    Staphylococcus aureus/MRSA colonization, Culture In process            Hospital Course   HPI: \"Meryl Leonard is a 74 y.o. female with past medical history of dementia, TIA, overactive bladder who presented to the emergency department yesterday after a syncopal episode at home.  Her daughter stated that she passed out while sitting at the table 1-1/2 hours prior to arrival.  Patient had stated she felt \"off\" all day.  When she was syncopal her daughter stated she was unresponsive for about 1 minute then confused afterwards she does have baseline confusion but has been getting progressively worse more recently.  She was not incontinent of urine or stool and did not appear to have seizure activity.  She denied headache, blurred vision, chest pain, palpitations, nausea, vomiting, diarrhea and had no focal neurodeficit in the emergency department.  Daughter also stated the patient had complained of near syncope for the past few days as well.  Her lab workup showed leukocytosis, " "urinalysis negative for signs of infection, CT head showed no acute process.  She was subsequently admitted to Medicine for observation, further treatment and evaluation and close monitoring of hemodynamic status.\"    Meryl was admitted to Medicine and treated for syncope.  Imaging of her head showed no acute infarct or hemorrhage.  She was seen by teleneurology who suggested syncopal workup outpatient and follow-up with neurology.   Chronic medical conditions were also addressed and monitored. PT/OT evals were done . Labs were closely monitored.  She was discharged in stable condition with the above medication changes and/or additions. Recommendations were made to follow up with pt's PCP in 1-2 weeks.   See full inpatient plan below.     Problem list:  TIA (transient ischemic attack)  Syncope  - presenting symptoms  - NIH initially  - glucose 106  - Lipid panel: Cholesterol 215, triglycerides 97, , HDL 76  - A1c pending  - CTH: no evidence of acute cortical infarct or intracranial hemorrhage  - MRI/MRA: There is no MRI evidence of acute infarction on the diffusion weighted images.   There is again evidence of moderate brain parenchymal volume loss.   Nonspecific white matter changes are again noted within cerebral hemispheres bilaterally as well as mild ill-defined increased signal on the FLAIR and T2 weighted images overlying the brainstem which while nonspecific, given the patient's age, likely represent sequelae of more remote small-vessel ischemic change. Additional small foci of bright signal on the T2 weighted images are again noted within the subinsular regions, basal ganglia, and thalami bilaterally suggesting incidental mildly prominent perivascular spaces and/or small scattered more remote lacunar infarctions.   The MRA of the neck demonstrates no significant stenosis along the carotid bifurcations. No significant stenosis is noted along the visualized cervical segments of the vertebral arteries.  "  The intracranial MRA demonstrates a right dominant A2 and more distal right anterior cerebral when compared with the left likely representing a congenital variation of normal. No significant intracranial stenosis or intracranial aneurysm is noted.   - EKG sinus rhythm with PVCs  - Echo: Pending read  - telemetry  - PT/OT evals  - continue Plavix, aspirin, statin  - tele-neuro consult  - neuro checks every 4  - safety precautions  --- Discharge home with daughter, follow-up with cardiology for syncope workup and neurology for monitoring of neurologic symptoms     Benign essential HTN  Hyperlipidemia  Dementia  Hypothyroid  --- Continue chronic medications, follow-up as needed with PCP    Pertinent Physical Exam At Time of Discharge  Physical Exam  Constitutional:       General: She is not in acute distress.     Appearance: Normal appearance. She is not toxic-appearing.   HENT:      Head: Normocephalic and atraumatic.      Mouth/Throat:      Mouth: Mucous membranes are moist.   Eyes:      Extraocular Movements: Extraocular movements intact.      Pupils: Pupils are equal, round, and reactive to light.   Cardiovascular:      Rate and Rhythm: Normal rate and regular rhythm.      Pulses: Normal pulses.      Heart sounds: Normal heart sounds. No murmur heard.     No gallop.   Pulmonary:      Effort: Pulmonary effort is normal. No respiratory distress.      Breath sounds: Normal breath sounds. No wheezing, rhonchi or rales.   Abdominal:      General: Bowel sounds are normal. There is no distension.      Palpations: Abdomen is soft.      Tenderness: There is no abdominal tenderness. There is no guarding or rebound.   Musculoskeletal:         General: No swelling, tenderness, deformity or signs of injury. Normal range of motion.      Cervical back: Normal range of motion and neck supple.   Skin:     General: Skin is warm and dry.      Capillary Refill: Capillary refill takes less than 2 seconds.      Coloration: Skin is not  jaundiced.      Findings: No bruising or rash.   Neurological:      General: No focal deficit present.      Mental Status: She is alert. Mental status is at baseline. She is disoriented.      Cranial Nerves: No cranial nerve deficit.      Sensory: No sensory deficit.      Motor: No weakness.      Gait: Gait normal.   Psychiatric:         Mood and Affect: Mood normal.         Behavior: Behavior normal.     Patient seen by Dr Marquez on day of discharge.  Stable for discharge to home.  Total cumulative time spent in preparation of this discharge including documentation review, coordination of care with the medical team including PT/SW/care coordinators and treating consultants, discussion with patient and pertinent family members and finalization of prescriptions, follow-up appointments, and this discharge summary was approximately 45 minutes.    Outpatient Follow-Up  Future Appointments   Date Time Provider Department Center   4/3/2025  1:20 PM DEVIN Camacho-CNP [id number]         DEVIN Chance-CNP

## 2025-04-02 ENCOUNTER — PATIENT OUTREACH (OUTPATIENT)
Dept: PRIMARY CARE | Facility: CLINIC | Age: 75
End: 2025-04-02
Payer: MEDICARE

## 2025-04-02 LAB — STAPHYLOCOCCUS SPEC CULT: NORMAL

## 2025-04-02 NOTE — PROGRESS NOTES
Discharge Facility: [facility]  Discharge Diagnosis: TIA (transient ischemic attack)   Admission Date: 03/31/2025  Discharge Date: 04/01/2025    PCP Appointment Date: 04/07/2025, scheduled by this   Specialist Appointment Date: Gyn/Onc 04/03/2025  Hospital Encounter and Summary Linked: Yes  ED to Hosp-Admission (Discharged) with Jamal Sullivan MD (03/31/2025)     See discharge assessment below for further details    Wrap Up  Wrap Up Additional Comments: CM spoke to patient via phone, She states that she is doing well at home. She has all needed medication at the home and no new scripts were given at discharge. PCP follow up appointment has been scheduled by this CM for 04/07/2025. She has no questions at this time and was very thankful for this call and assistance. (4/2/2025  9:05 AM)    Medications  Medications reviewed with patient/caregiver?: Yes (4/2/2025  9:05 AM)  Is the patient having any side effects they believe may be caused by any medication additions or changes?: No (4/2/2025  9:05 AM)  Does the patient have all medications ordered at discharge?: Not applicable (4/2/2025  9:05 AM)  Prescription Comments: No new scripts given at discharge (4/2/2025  9:05 AM)  Is the patient taking all medications as directed (includes completed medication regime)?: Yes (4/2/2025  9:05 AM)  Medication Comments: patient denies any issues affording medication (4/2/2025  9:05 AM)    Appointments  Does the patient have a primary care provider?: Yes (4/2/2025  9:05 AM)  Care Management Interventions: Verified appointment date/time/provider (PCP 04/07/2025, scheduled by this ) (4/2/2025  9:05 AM)  Has the patient kept scheduled appointments due by today?: Yes (4/2/2025  9:05 AM)    Self Management  What is the home health agency?: N/A (4/2/2025  9:05 AM)  What Durable Medical Equipment (DME) was ordered?: N/A (4/2/2025  9:05 AM)    Patient Teaching  Does the patient have access to their discharge instructions?: Yes (4/2/2025   9:05 AM)  Care Management Interventions: Reviewed instructions with patient (4/2/2025  9:05 AM)  What is the patient's perception of their health status since discharge?: Improving (4/2/2025  9:05 AM)  Is the patient/caregiver able to teach back the hierarchy of who to call/visit for symptoms/problems? PCP, Specialist, Home Health nurse, Urgent Care, ED, 911: Yes (4/2/2025  9:05 AM)

## 2025-04-03 ENCOUNTER — APPOINTMENT (OUTPATIENT)
Dept: GYNECOLOGIC ONCOLOGY | Facility: CLINIC | Age: 75
End: 2025-04-03
Payer: MEDICARE

## 2025-04-03 LAB
ATRIAL RATE: 69 BPM
P AXIS: 47 DEGREES
P OFFSET: 208 MS
P ONSET: 144 MS
PR INTERVAL: 158 MS
Q ONSET: 223 MS
QRS COUNT: 12 BEATS
QRS DURATION: 72 MS
QT INTERVAL: 390 MS
QTC CALCULATION(BAZETT): 417 MS
QTC FREDERICIA: 408 MS
R AXIS: 27 DEGREES
T AXIS: 91 DEGREES
T OFFSET: 418 MS
VENTRICULAR RATE: 69 BPM

## 2025-04-04 ENCOUNTER — TELEPHONE (OUTPATIENT)
Dept: PRIMARY CARE | Facility: CLINIC | Age: 75
End: 2025-04-04
Payer: MEDICARE

## 2025-04-04 DIAGNOSIS — J40 BRONCHITIS: Primary | ICD-10-CM

## 2025-04-04 RX ORDER — DOXYCYCLINE 100 MG/1
100 CAPSULE ORAL 2 TIMES DAILY
Qty: 14 CAPSULE | Refills: 0 | Status: SHIPPED | OUTPATIENT
Start: 2025-04-04 | End: 2025-04-11

## 2025-04-04 RX ORDER — BENZONATATE 200 MG/1
200 CAPSULE ORAL 3 TIMES DAILY PRN
Qty: 42 CAPSULE | Refills: 3 | Status: SHIPPED | OUTPATIENT
Start: 2025-04-04 | End: 2025-10-01

## 2025-04-07 ENCOUNTER — HOSPITAL ENCOUNTER (OUTPATIENT)
Dept: RADIOLOGY | Facility: CLINIC | Age: 75
Discharge: HOME | End: 2025-04-07
Payer: MEDICARE

## 2025-04-07 ENCOUNTER — OFFICE VISIT (OUTPATIENT)
Dept: PRIMARY CARE | Facility: CLINIC | Age: 75
End: 2025-04-07
Payer: MEDICARE

## 2025-04-07 VITALS
DIASTOLIC BLOOD PRESSURE: 66 MMHG | HEART RATE: 62 BPM | HEIGHT: 60 IN | SYSTOLIC BLOOD PRESSURE: 162 MMHG | RESPIRATION RATE: 16 BRPM | WEIGHT: 143 LBS | TEMPERATURE: 97.8 F | BODY MASS INDEX: 28.07 KG/M2 | OXYGEN SATURATION: 97 %

## 2025-04-07 DIAGNOSIS — M25.512 CHRONIC LEFT SHOULDER PAIN: ICD-10-CM

## 2025-04-07 DIAGNOSIS — G89.29 CHRONIC LEFT SHOULDER PAIN: ICD-10-CM

## 2025-04-07 DIAGNOSIS — I10 BENIGN ESSENTIAL HTN: ICD-10-CM

## 2025-04-07 DIAGNOSIS — R55 SYNCOPE, UNSPECIFIED SYNCOPE TYPE: Primary | ICD-10-CM

## 2025-04-07 PROCEDURE — 3078F DIAST BP <80 MM HG: CPT | Performed by: FAMILY MEDICINE

## 2025-04-07 PROCEDURE — 1125F AMNT PAIN NOTED PAIN PRSNT: CPT | Performed by: FAMILY MEDICINE

## 2025-04-07 PROCEDURE — 99496 TRANSJ CARE MGMT HIGH F2F 7D: CPT | Performed by: FAMILY MEDICINE

## 2025-04-07 PROCEDURE — 73030 X-RAY EXAM OF SHOULDER: CPT | Mod: LEFT SIDE | Performed by: RADIOLOGY

## 2025-04-07 PROCEDURE — 3077F SYST BP >= 140 MM HG: CPT | Performed by: FAMILY MEDICINE

## 2025-04-07 PROCEDURE — 1159F MED LIST DOCD IN RCRD: CPT | Performed by: FAMILY MEDICINE

## 2025-04-07 PROCEDURE — 1158F ADVNC CARE PLAN TLK DOCD: CPT | Performed by: FAMILY MEDICINE

## 2025-04-07 PROCEDURE — 73030 X-RAY EXAM OF SHOULDER: CPT | Mod: LT

## 2025-04-07 PROCEDURE — 1123F ACP DISCUSS/DSCN MKR DOCD: CPT | Performed by: FAMILY MEDICINE

## 2025-04-07 ASSESSMENT — PAIN SCALES - GENERAL: PAINLEVEL_OUTOF10: 4

## 2025-04-07 NOTE — PROGRESS NOTES
"Subjective   Patient ID: Meryl Leonard is a 74 y.o. female who presents for Hospital Follow-up (Pt is here for hospital follow up of syncope. Pt would like a physical therapy referral for left shoulder injury/ pain from a fall last year.).    HPI admitted 3/31-4/1 for TIA.  Hospital course as follows:  HPI: \"Meryl Leonard is a 74 y.o. female with past medical history of dementia, TIA, overactive bladder who presented to the emergency department yesterday after a syncopal episode at home.  Her daughter stated that she passed out while sitting at the table 1-1/2 hours prior to arrival.  Patient had stated she felt \"off\" all day.  When she was syncopal her daughter stated she was unresponsive for about 1 minute then confused afterwards she does have baseline confusion but has been getting progressively worse more recently.  She was not incontinent of urine or stool and did not appear to have seizure activity.  She denied headache, blurred vision, chest pain, palpitations, nausea, vomiting, diarrhea and had no focal neurodeficit in the emergency department.  Daughter also stated the patient had complained of near syncope for the past few days as well.  Her lab workup showed leukocytosis, urinalysis negative for signs of infection, CT head showed no acute process.  She was subsequently admitted to Medicine for observation, further treatment and evaluation and close monitoring of hemodynamic status.\"     Meryl was admitted to Medicine and treated for syncope.  Imaging of her head showed no acute infarct or hemorrhage.  She was seen by teleneurology who suggested syncopal workup outpatient and follow-up with neurology.   Chronic medical conditions were also addressed and monitored. PT/OT evals were done . Labs were closely monitored.  She was discharged in stable condition with the above medication changes and/or additions. Recommendations were made to follow up with pt's PCP in 1-2 weeks.     She present with her " daughter today for recheck.  Generally feeling well.  She has neuro follow up but not able to get in until the fall.  Also will set up cardiology follow up near future.   Review of Systems   Constitutional: Negative.    HENT: Negative.     Respiratory: Negative.     Cardiovascular: Negative.    Gastrointestinal: Negative.    Genitourinary: Negative.    Musculoskeletal: Negative.    Neurological: Negative.        Objective   /66   Pulse 62   Temp 36.6 °C (97.8 °F) (Temporal)   Resp 16   Ht 1.524 m (5')   Wt 64.9 kg (143 lb)   SpO2 97%   BMI 27.93 kg/m²     Physical Exam  Constitutional:       General: She is not in acute distress.     Appearance: Normal appearance.   Cardiovascular:      Rate and Rhythm: Normal rate and regular rhythm.      Heart sounds: No murmur heard.  Pulmonary:      Breath sounds: Normal breath sounds. No wheezing.   Neurological:      Mental Status: She is alert.         Assessment/Plan   Problem List Items Addressed This Visit             ICD-10-CM    Benign essential HTN I10    Syncope - Primary R55    Chronic left shoulder pain M25.512, G89.29    Relevant Orders    Referral to Physical Therapy    XR shoulder left 2+ views (Completed)     Follow up with specialists as discussed.  Increase fluids.  Follow up if any recurrent symptoms in the  meantime.

## 2025-04-09 ENCOUNTER — TELEPHONE (OUTPATIENT)
Dept: PRIMARY CARE | Facility: CLINIC | Age: 75
End: 2025-04-09
Payer: MEDICARE

## 2025-04-09 ENCOUNTER — EVALUATION (OUTPATIENT)
Dept: PHYSICAL THERAPY | Facility: HOSPITAL | Age: 75
End: 2025-04-09
Payer: MEDICARE

## 2025-04-09 ENCOUNTER — PATIENT OUTREACH (OUTPATIENT)
Dept: PRIMARY CARE | Facility: CLINIC | Age: 75
End: 2025-04-09
Payer: MEDICARE

## 2025-04-09 DIAGNOSIS — G45.9 TIA (TRANSIENT ISCHEMIC ATTACK): Primary | ICD-10-CM

## 2025-04-09 DIAGNOSIS — G89.29 CHRONIC LEFT SHOULDER PAIN: Primary | ICD-10-CM

## 2025-04-09 DIAGNOSIS — M25.512 CHRONIC LEFT SHOULDER PAIN: Primary | ICD-10-CM

## 2025-04-09 DIAGNOSIS — M19.90 ARTHRITIS: ICD-10-CM

## 2025-04-09 PROCEDURE — 97110 THERAPEUTIC EXERCISES: CPT | Mod: GP | Performed by: PHYSICAL THERAPIST

## 2025-04-09 PROCEDURE — 97161 PT EVAL LOW COMPLEX 20 MIN: CPT | Mod: GP | Performed by: PHYSICAL THERAPIST

## 2025-04-09 ASSESSMENT — ENCOUNTER SYMPTOMS
OCCASIONAL FEELINGS OF UNSTEADINESS: 0
LOSS OF SENSATION IN FEET: 0
DEPRESSION: 0

## 2025-04-09 ASSESSMENT — PAIN - FUNCTIONAL ASSESSMENT: PAIN_FUNCTIONAL_ASSESSMENT: 0-10

## 2025-04-09 ASSESSMENT — PAIN DESCRIPTION - DESCRIPTORS: DESCRIPTORS: ACHING

## 2025-04-09 ASSESSMENT — PAIN SCALES - GENERAL: PAINLEVEL_OUTOF10: 2

## 2025-04-09 NOTE — PROGRESS NOTES
Call regarding appt. with PCP on 04/07/2025 after hospitalization.  At time of outreach call the patient feels as if their condition has improved since last visit.  Reviewed the PCP appointment with the pt and addressed any questions or concerns.

## 2025-04-09 NOTE — PROGRESS NOTES
I reviewed the progress note and agree with the resident’s findings and plans as written. Case discussed with resident.    Abiola Conner, PharmD

## 2025-04-09 NOTE — PROGRESS NOTES
Physical Therapy  Physical Therapy Evaluation and Treatment    Patient Name: Meryl Leonard  MRN: [id number]  Today's Date: 4/9/2025  Time Calculation  Start Time: 1440  Stop Time: 1515  Time Calculation (min): 35 min    PT Evaluation Time Entry  PT Evaluation (Low) Time Entry: 25  PT Therapeutic Procedures Time Entry  Therapeutic Exercise Time Entry: 10                   Insurance:  Visit number: 1 of 5  Visit Limit: MN  Co-Pay: $0  Authorization info: no auth required  Payor: MEDICARE / Plan: MEDICARE PART A AND B / Product Type: *No Product type* /     Current Problem  1. Chronic left shoulder pain  Referral to Physical Therapy    Follow Up In Physical Therapy        Problem List Items Addressed This Visit             ICD-10-CM    Chronic left shoulder pain - Primary M25.512, G89.29    Relevant Orders    Follow Up In Physical Therapy       General:  General  Reason for Referral: left shoulder pain  Referred By: Dr. Dillard  Past Medical History Relevant to Rehab: benign essential HTN, anxiety, chronic left shoulder pain, dementia, TIA, syncope    Precautions:   Precautions  STEADI Fall Risk Score (The score of 4 or more indicates an increased risk of falling): 1  Medical Precautions: No known precautions/limitation    Medical History Form: Reviewed (scanned into chart)    Subjective:   Subjective   Chief Complaint: Patient is a 74 year old female who presents to clinic with complaints of left shoulder pain. Patient's left shoulder pain started after a fall about 1 year ago. Patient had a recent ED visit due to syncopal episode at home. Patient has a prior medical history including: benign essential HTN, anxiety, chronic left shoulder pain, dementia, TIA, syncope. Patient states that she can not reach behind her back and has difficulty reaching overhead due to pain. Patient is able to do her hair.  Onset Date: 4/7/2025  MARILY: Chronic    Current Condition:   Same    Pain:  Pain Assessment: 0-10  0-10 (Numeric) Pain  Score: 2  Pain Type: Chronic pain  Pain Location: Arm  Pain Orientation: Left  Pain Descriptors: Aching  Highest: medium pain  Lowest: 0/10 pain  Aggravating Factors:  Reaching Overhead and Reaching Behind Back  Relieving Factors:  Rest    Relevant Information (PMH & Previous Tests/Imaging):   Left shoulder xray on 4/7/2025:  FINDINGS:  No acute fracture or malalignment. Severe glenohumeral joint arthritis with joint space loss and osteophytes. Soft tissues are unremarkable.    Previous Interventions/Treatments: None    Prior Level of Function (PLOF)  Patient previously independent with all ADLs  Exercise/Physical Activity: house chores, helps take care of her farm animal  Work/School: retired   Hobbies: WFL    Hand dominance: right     Patients Living Environment: Reviewed and no concern    Primary Language: English    Patient's Goal(s) for Therapy: To use her arm normally.    Red Flags: Do you have any of the following? No  Fever/chills, unexplained weight changes, dizziness/fainting, unexplained change in bowel or bladder functions, unexplained malaise or muscle weakness, night pain/sweats, numbness or tingling    Objective:  Objective     Cervical AROM  Cervical AROM WFL: yes    Dermatomes  Dermatomes WFL: yes    Functional Rating Scale  Quick Dash: 11.36    Cervical AROM  Cervical AROM WFL: yes  Shoulder AROM  R Shoulder flexion: (180°): 160  L Shoulder flexion: (180°): 122  R shoulder abduction: (180°): 180  L Shoulder abduction: (180°): 82  R Shoulder ER: (90°): WFL  L shoulder ER: (90°): 50  R shoulder IR: (70°): T9  L shoulder IR: (70°): SI    Shoulder Strength  R shoulder flexion: (5/5): 5/5  L shoulder flexion: (5/5): 4/5  R shoulder abduction: (5/5): 5/5  L shoulder abduction: (5/5): 3+/5  R shoulder ER: (5/5): 5/5  L shoulder ER: (5/5): 4+/5  R shoulder IR: (5/5) : 5/5  L shoulder IR: (5/5): 4+/5    Elbow AROM  Elbow AROM WFL: yes    Elbow Strength  Elbow Strength WFL: yes      Posture:  shoulder rounded forward    Palpation: no tenderness to arm or shoulder    Joint Mobility: hypomobile      Special Tests    RTC/Impingement   Neer Impingement: +   Cody Parrish: -   Empty Can: -  AC Joint   Cross Arm Test: -  Biceps   Speeds Test: -      Outcome Measures:  Other Measures  Disability of Arm Shoulder Hand (DASH): 11.36     Treatment Performed:  Therapeutic Exercise  Therapeutic Exercise Performed: Yes  Therapeutic Exercise Activity 1: supine shoulder flex aarom x10  Therapeutic Exercise Activity 2: supine shoulder ER aarom x10  Therapeutic Exercise Activity 3: scap retraction x10  Therapeutic Exercise Activity 4: shoulder abd aarom x5    EDUCATION:   Individual(s) Educated: patient, patient's daughter  Education Provided: Home exercise program, plan of care, activity modifications, pain management, and injury pathology  Handout(s) Provided: Scanned into chart  Home Program: Access Code: [email or web address]  URL: [email or web address]  Date: 04/09/2025  Prepared by: Bc Burdick    Exercises  - Supine Shoulder Flexion Extension AAROM with Dowel  - 1-2 x daily - 10 reps  - Supine Shoulder External Rotation with Dowel  - 1-2 x daily - 10 reps  - Seated Scapular Retraction  - 1-2 x daily - 10 reps  - Seated Shoulder Abduction AAROM with Dowel  - 1-2 x daily - 10 reps    Risk and Benefits Discussed with Patient/Caregiver/Other: Yes   Patient/Caregiver Demonstrated Understanding: Yes   Plan of Care Discussed and Agreed Upon: Yes   Patient Response to Education: Patient/Caregiver verbalized understanding of information, Patient/Caregiver performed return demonstration of exercises/activities, and Patient/Caregiver asked appropriate questions    Assessment: Patient presents with impaired shoulder range of motion and strength resulting in limited participation in pain-free ADLs and inability to perform at their prior level of function. Patient demonstrated good tolerance to home exercise program  exercises without complaints of increased shoulder pain. Patient's daughter was present throughout the session to help with history and home exercise program education. Skilled PT warranted to address the above stated impairments, so the patient can perform FA's without increased pain or difficulty.    PT Assessment Results: Decreased strength, Decreased range of motion, Pain  Rehab Prognosis: Good  Evaluation/Treatment Tolerance: Patient limited by pain, Patient tolerated treatment well    Complexity: low    Plan:  Treatment/Interventions: Education/ Instruction, Manual therapy, Therapeutic activities, Therapeutic exercises  PT Plan: Skilled PT  PT Frequency: 1 time per week  Duration: 5 weeks  Onset Date: 04/07/25  Certification Period Start Date: 04/09/25  Certification Period End Date: 05/14/25  Number of Treatments Authorized: 1 of 5  Rehab Potential: Good  Plan of Care Agreement: Patient (daughter)    Goals: Set and discussed today  Active       PT Problem       Patient will improve active range of motion in deficit areas for ADL completion.          Start:  04/09/25    Expected End:  05/14/25            Patient will improve strength in deficit areas so patient can perform household chores with less pain.         Start:  04/09/25    Expected End:  05/14/25            Patient will demonstrate independence in home program for support of progression       Start:  04/09/25    Expected End:  04/23/25            Patient will report pain of no more than 2/10 demonstrating a reduction of overall pain       Start:  04/09/25    Expected End:  04/23/25            Patient will show a significant change in Quick Dash (11.36 to 3.36) patient reported outcome tool to demonstrate subjective imporovement       Start:  04/09/25    Expected End:  05/14/25                Plan of care was developed with input and agreement by the patient    Ambulatory Screenings Summary       Screening  Frequency  Date Last Completed   Spiritual  and Cultural Beliefs   Screening  each visit or episode of care 4/9/2025   Falls Risk Screening  every ambulatory visit 4/9/2025  2:49 PM   Pain Screening  annually at primary care visit  4/7/2025   Domestic Violence screening  annually at primary care visit 4/9/2025   Elder Abuse Screening  annually at primary care visit 4/19/2024   Depression Screening  annually in the primary care setting 4/1/2025   Suicide Risk Screening  annually in the primary care setting 3/31/2025   Nutrition and Food Insecurity   Screening  at least annually at primary care visit  4/1/2025   Key Learner  annually in the primary care setting 4/9/2025   Drug Screen  4/7/2025  1:33 PM   Alcohol Screen  4/7/2025  1:33 PM   Advance Directive  4/19/2024         Bc Burdick, PT

## 2025-04-10 ASSESSMENT — ENCOUNTER SYMPTOMS
CONSTITUTIONAL NEGATIVE: 1
CARDIOVASCULAR NEGATIVE: 1
MUSCULOSKELETAL NEGATIVE: 1
GASTROINTESTINAL NEGATIVE: 1
NEUROLOGICAL NEGATIVE: 1
RESPIRATORY NEGATIVE: 1

## 2025-04-16 ENCOUNTER — APPOINTMENT (OUTPATIENT)
Dept: PHYSICAL THERAPY | Facility: HOSPITAL | Age: 75
End: 2025-04-16
Payer: MEDICARE

## 2025-04-30 NOTE — PROGRESS NOTES
Patient ID: Meryl Leonard is a 74 y.o. female.  Primary Care Provider: Wilson Dillard DO    Subjective    HPI: 65yo referred from Dr. Cool for CAH. She states that she has had spotting for the last few years. Had an US which showed an 11mm EMS. She then underwent an EMB which showed CAH.      Medical History:  HTN  No stroke, MI, or DVT    Surgical History:  Inguinal hernia repair    Social History:  Former smoker 20 years ago  Occasional EtOH  No illicits  Retired from [employer]    Obstetrics/Gynecology History:     x 3 without complication  Last pap  and WNL, remote h/o abnormal pap  Menarche at age 12  Menopause in her 40s  HRT x 6 years  Last mammogram  and WNL  She has never had a colonoscopy    Family History:   Paternal aunt with cervical or uterine cancer  No breast, ovarian, or colon cancer      Objective    There were no vitals taken for this visit.       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 Here for annual exam.  Patient now with memory loss.  Patients daughter is with her at appointment today able to answer health questions. Patient denies any vaginal bleeding, pink tinged discharge. Patient denies any constipation or diarrhea.  Appetite has been good.  Energy levels are baseline.  Patient denies hematuria.  Patient denies urinary leakage or incontinence.  Going to [facility] for summer, taking care of horses.   Patient is not currently sexually active. Mammogram is up to date.          Physical Exam:    Constitutional: Doing well. LAVINIA  Eyes: PERRL  ENMT: Moist mucus membranes  Head/Neck: Supple. Symmetrical  Cardiovascular: Regular, rate and rhythm. 2+ equal pulses  of the extremities  Respiratory/Thorax: CTA. RRR. Chest rise symmetrical.  Gastrointestinal: Non-distended, soft, non-tender  Genitourinary:   Normal external female genitalia. No vulvar lesions noted  Speculum exam: Smooth vaginal walls without lesions or masses. Vaginal cuff visualized without lesions, atrophic changes  Bimanual exam: Smooth vaginal wall without lesions or masses.  Surgically absent uterus, cervix, and adnexa.    Rectovaginal exam: smooth rectovaginal septum without lesions or masses  Musculoskeletal: ROM intact, no joint swelling, normal strength  Extremities: No edema  Neurological: Alert and oriented x 3. Pleasant and cooperative.  Lymphatic: No lymphadenopathy. No lymphedema  Psychological: Appropriate mood and behavior  Skin: Warm and dry, no lesions, no rashes    A complete review of systems was performed and all systems were normal except what is noted in the interval history.          Assessment/Plan   Patient is a 74 year old female that is s/p total laparoscopic hysterectomy, bilateral salpingo-oophorectomy and modified Wall Currie Culdoplasty with enterocele repair, anterior/posterior colporrhaphy, perineoplasty, cystoscopy on 3/21/17 for pelvic organ prolapse stage II and 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  LAVINIA 8 years.       PLAN:  F/U in 1 year or as needed  Mammogram order  Physical examination was within normal limits today.  She is currently LAVINIA.  We reviewed signs and symptoms of possible recurrence with the patient and she will call our office should she experience any of these.

## 2025-05-01 ENCOUNTER — APPOINTMENT (OUTPATIENT)
Dept: GYNECOLOGIC ONCOLOGY | Facility: CLINIC | Age: 75
End: 2025-05-01
Payer: MEDICARE

## 2025-05-01 DIAGNOSIS — Z12.31 SCREENING MAMMOGRAM, ENCOUNTER FOR: Primary | ICD-10-CM

## 2025-05-01 DIAGNOSIS — C54.1 ENDOMETRIAL CANCER (MULTI): ICD-10-CM

## 2025-05-01 PROCEDURE — 1123F ACP DISCUSS/DSCN MKR DOCD: CPT | Performed by: NURSE PRACTITIONER

## 2025-05-01 PROCEDURE — 99213 OFFICE O/P EST LOW 20 MIN: CPT | Performed by: NURSE PRACTITIONER

## 2025-05-02 ENCOUNTER — PATIENT OUTREACH (OUTPATIENT)
Dept: PRIMARY CARE | Facility: CLINIC | Age: 75
End: 2025-05-02
Payer: MEDICARE

## 2025-05-05 DIAGNOSIS — E03.9 HYPOTHYROIDISM, UNSPECIFIED TYPE: ICD-10-CM

## 2025-05-05 DIAGNOSIS — E78.5 HYPERLIPIDEMIA, UNSPECIFIED HYPERLIPIDEMIA TYPE: ICD-10-CM

## 2025-05-05 DIAGNOSIS — E53.8 VITAMIN B12 DEFICIENCY: ICD-10-CM

## 2025-05-05 DIAGNOSIS — R41.3 MEMORY LOSS: ICD-10-CM

## 2025-05-05 DIAGNOSIS — I10 BENIGN ESSENTIAL HTN: ICD-10-CM

## 2025-05-05 LAB
ATRIAL RATE: 88 BPM
P AXIS: 68 DEGREES
P OFFSET: 209 MS
P ONSET: 147 MS
PR INTERVAL: 156 MS
Q ONSET: 225 MS
QRS COUNT: 14 BEATS
QRS DURATION: 78 MS
QT INTERVAL: 378 MS
QTC CALCULATION(BAZETT): 457 MS
QTC FREDERICIA: 429 MS
R AXIS: 46 DEGREES
T AXIS: 84 DEGREES
T OFFSET: 414 MS
VENTRICULAR RATE: 88 BPM

## 2025-05-06 ENCOUNTER — APPOINTMENT (OUTPATIENT)
Dept: RADIOLOGY | Facility: HOSPITAL | Age: 75
End: 2025-05-06
Payer: MEDICARE

## 2025-05-06 VITALS — WEIGHT: 143 LBS | BODY MASS INDEX: 28.07 KG/M2 | HEIGHT: 60 IN

## 2025-05-06 DIAGNOSIS — Z12.31 SCREENING MAMMOGRAM, ENCOUNTER FOR: ICD-10-CM

## 2025-05-06 PROCEDURE — 77063 BREAST TOMOSYNTHESIS BI: CPT

## 2025-05-06 PROCEDURE — 77063 BREAST TOMOSYNTHESIS BI: CPT | Performed by: RADIOLOGY

## 2025-05-06 PROCEDURE — 77067 SCR MAMMO BI INCL CAD: CPT | Performed by: RADIOLOGY

## 2025-05-06 RX ORDER — MAGNESIUM 200 MG
1000 TABLET ORAL DAILY
Qty: 90 TABLET | Refills: 1 | Status: SHIPPED | OUTPATIENT
Start: 2025-05-06

## 2025-05-06 RX ORDER — DONEPEZIL HYDROCHLORIDE 10 MG/1
10 TABLET, FILM COATED ORAL NIGHTLY
Qty: 90 TABLET | Refills: 3 | Status: SHIPPED | OUTPATIENT
Start: 2025-05-06

## 2025-05-06 RX ORDER — METOPROLOL SUCCINATE 50 MG/1
50 TABLET, EXTENDED RELEASE ORAL DAILY
Qty: 90 TABLET | Refills: 3 | Status: SHIPPED | OUTPATIENT
Start: 2025-05-06

## 2025-05-06 RX ORDER — LEVOTHYROXINE SODIUM 75 UG/1
75 TABLET ORAL DAILY
Qty: 90 TABLET | Refills: 3 | Status: SHIPPED | OUTPATIENT
Start: 2025-05-06

## 2025-05-06 RX ORDER — PRAVASTATIN SODIUM 20 MG/1
20 TABLET ORAL NIGHTLY
Qty: 90 TABLET | Refills: 3 | Status: SHIPPED | OUTPATIENT
Start: 2025-05-06

## 2025-05-29 ENCOUNTER — APPOINTMENT (OUTPATIENT)
Dept: PRIMARY CARE | Facility: CLINIC | Age: 75
End: 2025-05-29
Payer: MEDICARE

## 2025-06-06 ENCOUNTER — TELEPHONE (OUTPATIENT)
Dept: PRIMARY CARE | Facility: CLINIC | Age: 75
End: 2025-06-06
Payer: MEDICARE

## 2025-06-12 ENCOUNTER — OFFICE VISIT (OUTPATIENT)
Dept: PRIMARY CARE | Facility: CLINIC | Age: 75
End: 2025-06-12
Payer: MEDICARE

## 2025-06-12 ENCOUNTER — HOSPITAL ENCOUNTER (OUTPATIENT)
Dept: RADIOLOGY | Facility: HOSPITAL | Age: 75
Discharge: HOME | End: 2025-06-12
Payer: MEDICARE

## 2025-06-12 VITALS
WEIGHT: 147 LBS | RESPIRATION RATE: 16 BRPM | DIASTOLIC BLOOD PRESSURE: 58 MMHG | HEIGHT: 60 IN | HEART RATE: 64 BPM | SYSTOLIC BLOOD PRESSURE: 136 MMHG | TEMPERATURE: 96.5 F | BODY MASS INDEX: 28.86 KG/M2 | OXYGEN SATURATION: 96 %

## 2025-06-12 DIAGNOSIS — F03.90 DEMENTIA, UNSPECIFIED DEMENTIA SEVERITY, UNSPECIFIED DEMENTIA TYPE, UNSPECIFIED WHETHER BEHAVIORAL, PSYCHOTIC, OR MOOD DISTURBANCE OR ANXIETY (MULTI): Primary | ICD-10-CM

## 2025-06-12 DIAGNOSIS — R55 SYNCOPE AND COLLAPSE: ICD-10-CM

## 2025-06-12 PROCEDURE — G2211 COMPLEX E/M VISIT ADD ON: HCPCS | Performed by: FAMILY MEDICINE

## 2025-06-12 PROCEDURE — 1126F AMNT PAIN NOTED NONE PRSNT: CPT | Performed by: FAMILY MEDICINE

## 2025-06-12 PROCEDURE — 3078F DIAST BP <80 MM HG: CPT | Performed by: FAMILY MEDICINE

## 2025-06-12 PROCEDURE — 3075F SYST BP GE 130 - 139MM HG: CPT | Performed by: FAMILY MEDICINE

## 2025-06-12 PROCEDURE — 1159F MED LIST DOCD IN RCRD: CPT | Performed by: FAMILY MEDICINE

## 2025-06-12 PROCEDURE — 99213 OFFICE O/P EST LOW 20 MIN: CPT | Performed by: FAMILY MEDICINE

## 2025-06-12 PROCEDURE — 93880 EXTRACRANIAL BILAT STUDY: CPT

## 2025-06-12 PROCEDURE — 93880 EXTRACRANIAL BILAT STUDY: CPT | Performed by: SURGERY

## 2025-06-12 RX ORDER — MEMANTINE HYDROCHLORIDE 5 MG/1
5 TABLET ORAL 2 TIMES DAILY
COMMUNITY
Start: 2025-06-11

## 2025-06-12 ASSESSMENT — ENCOUNTER SYMPTOMS
OCCASIONAL FEELINGS OF UNSTEADINESS: 0
DEPRESSION: 0
LOSS OF SENSATION IN FEET: 0

## 2025-06-12 ASSESSMENT — COLUMBIA-SUICIDE SEVERITY RATING SCALE - C-SSRS
2. HAVE YOU ACTUALLY HAD ANY THOUGHTS OF KILLING YOURSELF?: NO
1. IN THE PAST MONTH, HAVE YOU WISHED YOU WERE DEAD OR WISHED YOU COULD GO TO SLEEP AND NOT WAKE UP?: NO
6. HAVE YOU EVER DONE ANYTHING, STARTED TO DO ANYTHING, OR PREPARED TO DO ANYTHING TO END YOUR LIFE?: NO

## 2025-06-12 ASSESSMENT — PAIN SCALES - GENERAL: PAINLEVEL_OUTOF10: 0-NO PAIN

## 2025-06-12 NOTE — PROGRESS NOTES
Subjective   Patient ID: Meryl Leonard is a 74 y.o. female who presents for paperwork (Pt is here to get competency paperwork signed.).    HPI pt presents with a family member to have competency paperwork filled out.  This is so that she can sign off on some financial issues that that are working on.  She has hx of dementia and had seen neuro recently but did not address the issues.  She is currently on [place].        Review of Systems see HPI    Objective   /58   Pulse 64   Temp 35.8 °C (96.5 °F) (Temporal)   Resp 16   Ht 1.524 m (5')   Wt 66.7 kg (147 lb)   SpO2 96%   BMI 28.71 kg/m²     Physical Exam  Constitutional:       General: She is not in acute distress.     Appearance: Normal appearance.   Cardiovascular:      Rate and Rhythm: Normal rate and regular rhythm.      Heart sounds: No murmur heard.  Pulmonary:      Breath sounds: Normal breath sounds. No wheezing.   Neurological:      Mental Status: She is alert.         Assessment/Plan   Problem List Items Addressed This Visit           ICD-10-CM    Dementia - Primary F03.90    Relevant Orders    Referral to Psychiatry     Referred to geriatric psych for evaluation as I am concerned that she may not be competent for financial decision making.  One example is that I had asked what she had for breakfast today and also for lunch and she did not recall and was looking at her family member for assistance.

## 2025-07-24 ENCOUNTER — TELEPHONE (OUTPATIENT)
Dept: PRIMARY CARE | Facility: CLINIC | Age: 75
End: 2025-07-24
Payer: MEDICARE

## 2025-07-24 DIAGNOSIS — R41.3 MEMORY LOSS: ICD-10-CM

## 2025-07-24 NOTE — TELEPHONE ENCOUNTER
Called patients daughter Marylou to notify referral placed. No voicemail box setup unable to leave a message.

## 2025-07-30 ENCOUNTER — APPOINTMENT (OUTPATIENT)
Dept: BEHAVIORAL HEALTH | Facility: CLINIC | Age: 75
End: 2025-07-30
Payer: MEDICARE

## 2025-08-06 ENCOUNTER — DOCUMENTATION (OUTPATIENT)
Dept: PHYSICAL THERAPY | Facility: HOSPITAL | Age: 75
End: 2025-08-06
Payer: MEDICARE

## 2025-08-06 NOTE — PROGRESS NOTES
Physical Therapy  Discharge Summary    Name: Meryl Leonard  MRN: [id number]  : 1950  Date of DC: 25  Date of initial evaluation: 2025    Functional Status at Discharge: Unable to assess due to non-compliance    Rehab Discharge Reason: Failed to schedule and/or keep follow-up appointment(s)    Discharge Plan: Unable to provide due to non-compliance    Was this episode resolved in Epic: Yes

## 2025-10-06 ENCOUNTER — APPOINTMENT (OUTPATIENT)
Dept: NEUROLOGY | Facility: CLINIC | Age: 75
End: 2025-10-06
Payer: MEDICARE

## 2026-05-07 ENCOUNTER — APPOINTMENT (OUTPATIENT)
Dept: GYNECOLOGIC ONCOLOGY | Facility: CLINIC | Age: 76
End: 2026-05-07
Payer: MEDICARE